# Patient Record
Sex: MALE | Race: WHITE | Employment: FULL TIME | URBAN - METROPOLITAN AREA
[De-identification: names, ages, dates, MRNs, and addresses within clinical notes are randomized per-mention and may not be internally consistent; named-entity substitution may affect disease eponyms.]

---

## 2017-02-09 ENCOUNTER — OFFICE VISIT (OUTPATIENT)
Dept: FAMILY MEDICINE CLINIC | Facility: CLINIC | Age: 47
End: 2017-02-09

## 2017-02-09 VITALS
OXYGEN SATURATION: 98 % | WEIGHT: 271 LBS | SYSTOLIC BLOOD PRESSURE: 110 MMHG | HEIGHT: 72 IN | BODY MASS INDEX: 36.7 KG/M2 | TEMPERATURE: 98 F | DIASTOLIC BLOOD PRESSURE: 78 MMHG | HEART RATE: 96 BPM

## 2017-02-09 DIAGNOSIS — Z00.00 ROUTINE PHYSICAL EXAMINATION: Primary | ICD-10-CM

## 2017-02-09 DIAGNOSIS — I10 ESSENTIAL HYPERTENSION: ICD-10-CM

## 2017-02-09 DIAGNOSIS — E78.00 HYPERCHOLESTEROLEMIA: ICD-10-CM

## 2017-02-09 DIAGNOSIS — Z23 IMMUNIZATION DUE: ICD-10-CM

## 2017-02-09 PROBLEM — G47.33 OSA ON CPAP: Status: ACTIVE | Noted: 2017-02-09

## 2017-02-09 PROBLEM — Z99.89 OSA ON CPAP: Status: ACTIVE | Noted: 2017-02-09

## 2017-02-09 PROCEDURE — 90715 TDAP VACCINE 7 YRS/> IM: CPT | Performed by: FAMILY MEDICINE

## 2017-02-09 PROCEDURE — 90471 IMMUNIZATION ADMIN: CPT | Performed by: FAMILY MEDICINE

## 2017-02-09 PROCEDURE — 99396 PREV VISIT EST AGE 40-64: CPT | Performed by: FAMILY MEDICINE

## 2017-02-09 RX ORDER — OMEGA-3-ACID ETHYL ESTERS 1 G/1
1 CAPSULE, LIQUID FILLED ORAL DAILY
COMMUNITY
End: 2021-08-16

## 2017-02-09 RX ORDER — VALSARTAN AND HYDROCHLOROTHIAZIDE 80; 12.5 MG/1; MG/1
TABLET, FILM COATED ORAL
Refills: 0 | COMMUNITY
Start: 2016-12-30 | End: 2017-04-04

## 2017-02-09 RX ORDER — VALSARTAN 80 MG/1
80 TABLET ORAL DAILY
COMMUNITY
End: 2017-02-09

## 2017-02-09 RX ORDER — SILDENAFIL 100 MG/1
100 TABLET, FILM COATED ORAL
Qty: 8 TABLET | Refills: 11 | Status: SHIPPED | OUTPATIENT
Start: 2017-02-09 | End: 2017-06-23

## 2017-02-09 NOTE — H&P
Ami Karyn is a 55year old male who presents for a complete physical exam.   HPI:   Patient presents for general physical exam   he has chronic lower back pain with occasional exacerbations.   Last significant exacerbation which affected his functionali Lumbar radicular syndrome     Lumbar disc herniation with radiculopathy     Back pain     Other and unspecified hyperlipidemia     Hypercholesterolemia     Spinal stenosis     Hives     SURENDRA on CPAP    Past Medical History   Diagnosis Date   • Other and un HSM or tenderness  : two descended testes,no masses,no hernia,no penile lesions  RECTAL: good rectal tone, prostate shows no masses  EXTREMITIES: no cyanosis, clubbing or edema  NEURO: Oriented times three,cranial nerves are intact,motor and sensory are

## 2017-02-14 ENCOUNTER — MED REC SCAN ONLY (OUTPATIENT)
Dept: FAMILY MEDICINE CLINIC | Facility: CLINIC | Age: 47
End: 2017-02-14

## 2017-02-14 ENCOUNTER — TELEPHONE (OUTPATIENT)
Dept: FAMILY MEDICINE CLINIC | Facility: CLINIC | Age: 47
End: 2017-02-14

## 2017-02-14 NOTE — TELEPHONE ENCOUNTER
Spoke with pt per dr Jaxson Garcia vitamin D low. Recommend start taking vitamin D 2000 IU OTC daily. Blood sugar slightly elevated, recommend to exercised and weight loss. Patient verbally  Understood.

## 2017-04-04 RX ORDER — ATORVASTATIN CALCIUM 10 MG/1
10 TABLET, FILM COATED ORAL NIGHTLY
Qty: 90 TABLET | Refills: 3 | Status: SHIPPED | OUTPATIENT
Start: 2017-04-04 | End: 2018-05-15

## 2017-04-04 RX ORDER — VALSARTAN AND HYDROCHLOROTHIAZIDE 80; 12.5 MG/1; MG/1
TABLET, FILM COATED ORAL
Qty: 90 TABLET | Refills: 3 | Status: SHIPPED | OUTPATIENT
Start: 2017-04-04 | End: 2018-08-20

## 2017-06-23 ENCOUNTER — OFFICE VISIT (OUTPATIENT)
Dept: FAMILY MEDICINE CLINIC | Facility: CLINIC | Age: 47
End: 2017-06-23

## 2017-06-23 VITALS
DIASTOLIC BLOOD PRESSURE: 70 MMHG | TEMPERATURE: 99 F | HEIGHT: 72 IN | BODY MASS INDEX: 35.25 KG/M2 | HEART RATE: 109 BPM | WEIGHT: 260.25 LBS | OXYGEN SATURATION: 96 % | RESPIRATION RATE: 16 BRPM | SYSTOLIC BLOOD PRESSURE: 130 MMHG

## 2017-06-23 DIAGNOSIS — N52.9 VASCULOGENIC ERECTILE DYSFUNCTION, UNSPECIFIED VASCULOGENIC ERECTILE DYSFUNCTION TYPE: Primary | ICD-10-CM

## 2017-06-23 PROCEDURE — 99214 OFFICE O/P EST MOD 30 MIN: CPT | Performed by: FAMILY MEDICINE

## 2017-06-23 RX ORDER — TADALAFIL 20 MG/1
20 TABLET ORAL AS NEEDED
Qty: 24 TABLET | Refills: 3 | Status: SHIPPED | OUTPATIENT
Start: 2017-06-23 | End: 2018-08-20

## 2017-06-23 NOTE — PROGRESS NOTES
CC: follow up    HPI:     ED:   Quality difficulty achieving and maintaining  Severity moderate  Duration chronic  Timing routine  Context viagra too expensive    ROS: no fever or chills, no cp or sob, no dysuria, no dizziness    Past Medical History   [place]

## 2017-09-17 ENCOUNTER — HOSPITAL ENCOUNTER (OUTPATIENT)
Age: 47
Discharge: HOME | End: 2017-09-17
Attending: FAMILY MEDICINE
Payer: COMMERCIAL

## 2017-09-17 VITALS
BODY MASS INDEX: 36.57 KG/M2 | TEMPERATURE: 98 F | SYSTOLIC BLOOD PRESSURE: 133 MMHG | WEIGHT: 270 LBS | HEIGHT: 72 IN | OXYGEN SATURATION: 97 % | HEART RATE: 81 BPM | DIASTOLIC BLOOD PRESSURE: 78 MMHG | RESPIRATION RATE: 16 BRPM

## 2017-09-17 DIAGNOSIS — M62.830 LUMBAR PARASPINAL MUSCLE SPASM: Primary | ICD-10-CM

## 2017-09-17 DIAGNOSIS — M54.50 BACK PAIN OF THORACOLUMBAR REGION: ICD-10-CM

## 2017-09-17 DIAGNOSIS — M54.6 BACK PAIN OF THORACOLUMBAR REGION: ICD-10-CM

## 2017-09-17 PROCEDURE — 99214 OFFICE O/P EST MOD 30 MIN: CPT

## 2017-09-17 PROCEDURE — 99213 OFFICE O/P EST LOW 20 MIN: CPT

## 2017-09-17 RX ORDER — METHYLPREDNISOLONE 4 MG/1
TABLET ORAL
Qty: 1 PACKAGE | Refills: 0 | Status: SHIPPED | OUTPATIENT
Start: 2017-09-17 | End: 2017-09-24

## 2017-09-17 NOTE — ED PROVIDER NOTES
Patient Seen in: [address] [facility]    History   Patient presents with:  Back Pain (musculoskeletal)    Stated Complaint: back pain    HPI  71-year-old male coming in with low mid left-sided back pain that he has had since Monday, over the l as otherwise stated in HPI.     Physical Exam   ED Triage Vitals [09/17/17 1127]  BP: 133/78  Pulse: 81  Resp: 16  Temp: 97.7 °F (36.5 °C)  Temp src: Temporal  SpO2: 97 %  O2 Device: None (Room air)    Current:/78   Pulse 81   Temp 97.7 °F (36.5 °C) ( drowsiness and dizziness and should not be used during the day time if out and about   STOP ALL NSAIDs - no ibuprofen / aleve / advil while on the Medrol dose pack   Alternate sit stand and walk   Exercises - back exercises at least 3 times per day recomme

## 2017-09-21 ENCOUNTER — OFFICE VISIT (OUTPATIENT)
Dept: FAMILY MEDICINE CLINIC | Facility: CLINIC | Age: 47
End: 2017-09-21

## 2017-09-21 VITALS
TEMPERATURE: 99 F | DIASTOLIC BLOOD PRESSURE: 82 MMHG | WEIGHT: 263 LBS | HEIGHT: 72 IN | SYSTOLIC BLOOD PRESSURE: 130 MMHG | OXYGEN SATURATION: 98 % | HEART RATE: 89 BPM | RESPIRATION RATE: 14 BRPM | BODY MASS INDEX: 35.62 KG/M2

## 2017-09-21 DIAGNOSIS — M54.5 RIGHT LOW BACK PAIN, UNSPECIFIED CHRONICITY, WITH SCIATICA PRESENCE UNSPECIFIED: Primary | ICD-10-CM

## 2017-09-21 DIAGNOSIS — M51.36 DDD (DEGENERATIVE DISC DISEASE), LUMBAR: ICD-10-CM

## 2017-09-21 DIAGNOSIS — M48.061 LUMBAR STENOSIS: ICD-10-CM

## 2017-09-21 PROCEDURE — 99214 OFFICE O/P EST MOD 30 MIN: CPT | Performed by: FAMILY MEDICINE

## 2017-09-21 RX ORDER — HYDROCODONE BITARTRATE AND ACETAMINOPHEN 10; 325 MG/1; MG/1
1 TABLET ORAL NIGHTLY PRN
Qty: 15 TABLET | Refills: 0 | Status: SHIPPED | OUTPATIENT
Start: 2017-09-21 | End: 2018-02-26

## 2017-09-21 NOTE — PROGRESS NOTES
CC: back pain    HPI:     Location lower back  Quality aching, sharp, tight  Severity moderrate  Duration about 9 days  Modifying factors worse when sleeping  Associated symptoms no bowel or bladder symptoms    ROS:   GENERAL HEALTH: feels well otherwise

## 2017-09-28 ENCOUNTER — MED REC SCAN ONLY (OUTPATIENT)
Dept: FAMILY MEDICINE CLINIC | Facility: CLINIC | Age: 47
End: 2017-09-28

## 2017-10-23 ENCOUNTER — MED REC SCAN ONLY (OUTPATIENT)
Dept: FAMILY MEDICINE CLINIC | Facility: CLINIC | Age: 47
End: 2017-10-23

## 2018-02-26 ENCOUNTER — OFFICE VISIT (OUTPATIENT)
Dept: FAMILY MEDICINE CLINIC | Facility: CLINIC | Age: 48
End: 2018-02-26

## 2018-02-26 ENCOUNTER — TELEPHONE (OUTPATIENT)
Dept: FAMILY MEDICINE CLINIC | Facility: CLINIC | Age: 48
End: 2018-02-26

## 2018-02-26 VITALS
TEMPERATURE: 99 F | BODY MASS INDEX: 36.98 KG/M2 | HEIGHT: 72 IN | SYSTOLIC BLOOD PRESSURE: 124 MMHG | WEIGHT: 273 LBS | RESPIRATION RATE: 18 BRPM | HEART RATE: 98 BPM | DIASTOLIC BLOOD PRESSURE: 80 MMHG | OXYGEN SATURATION: 97 %

## 2018-02-26 DIAGNOSIS — J01.10 ACUTE NON-RECURRENT FRONTAL SINUSITIS: Primary | ICD-10-CM

## 2018-02-26 DIAGNOSIS — Z00.00 GENERAL MEDICAL EXAM: ICD-10-CM

## 2018-02-26 PROCEDURE — 99214 OFFICE O/P EST MOD 30 MIN: CPT | Performed by: FAMILY MEDICINE

## 2018-02-26 RX ORDER — AZITHROMYCIN 250 MG/1
TABLET, FILM COATED ORAL
Qty: 6 TABLET | Refills: 0 | Status: SHIPPED | OUTPATIENT
Start: 2018-02-26 | End: 2018-08-20 | Stop reason: ALTCHOICE

## 2018-02-26 NOTE — PROGRESS NOTES
CC: congestion    HPI:     Location nasal, sinus  Quality pressure, drainage  Severity moderate  Duration several days  Timing constant  Associated symptoms some sore throat    ROS: no vomiting or diarrhea, pos ear pain    Past Medical History:   Diagnosis

## 2018-05-15 ENCOUNTER — TELEPHONE (OUTPATIENT)
Dept: FAMILY MEDICINE CLINIC | Facility: CLINIC | Age: 48
End: 2018-05-15

## 2018-05-15 RX ORDER — ATORVASTATIN CALCIUM 10 MG/1
10 TABLET, FILM COATED ORAL NIGHTLY
Qty: 30 TABLET | Refills: 0 | Status: SHIPPED | OUTPATIENT
Start: 2018-05-15 | End: 2018-06-14

## 2018-05-15 NOTE — TELEPHONE ENCOUNTER
Patient is out of his lipitor  Patient states he is going to get his [facility] work [facility] and then schedule his physical appointment. Patient wanted to know if a 30 day bridging supply can be sent to pharmacy to last until he comes in. Rx pended.   Ple

## 2018-06-15 RX ORDER — ATORVASTATIN CALCIUM 10 MG/1
TABLET, FILM COATED ORAL
Qty: 30 TABLET | Refills: 0 | Status: SHIPPED | OUTPATIENT
Start: 2018-06-15 | End: 2018-08-20 | Stop reason: DRUGHIGH

## 2018-06-15 NOTE — TELEPHONE ENCOUNTER
LOV: 2/26/18 frontal sinusitis    atorvastatin 10 MG Oral Tab 30 tablet 0 5/15/2018    Sig :  Take 1 tablet (10 mg total) by mouth nightly. Route:   Oral       No future appointments. Please advise.

## 2018-06-22 NOTE — TELEPHONE ENCOUNTER
Patient advised and verbalized understanding. Patient states he's going to get his labs done at [facility] and then follow up.

## 2018-07-23 ENCOUNTER — TELEPHONE (OUTPATIENT)
Dept: FAMILY MEDICINE CLINIC | Facility: CLINIC | Age: 48
End: 2018-07-23

## 2018-07-23 NOTE — TELEPHONE ENCOUNTER
Left message for patient to call. Need to advise of Valsartan recall. Patient needs to contact pharmacy to determine if his medication is from an affected pharmacy and call this office back to notify.

## 2018-07-27 NOTE — TELEPHONE ENCOUNTER
Spoke to patient. He will contact pharmacy to determine if his medication was affected by the recall and call this office back to let us know.

## 2018-08-07 NOTE — TELEPHONE ENCOUNTER
Phone call to patient. States he contacted his pharmacy and his medication is not affected by the recall.

## 2018-08-16 LAB
ABSOLUTE BASOPHILS: 38 CELLS/UL (ref 0–200)
ABSOLUTE EOSINOPHILS: 162 CELLS/UL (ref 15–500)
ABSOLUTE LYMPHOCYTES: 1231 CELLS/UL (ref 850–3900)
ABSOLUTE MONOCYTES: 599 CELLS/UL (ref 200–950)
ABSOLUTE NEUTROPHILS: 3370 CELLS/UL (ref 1500–7800)
ALBUMIN/GLOBULIN RATIO: 2 (CALC) (ref 1–2.5)
ALBUMIN: 4.5 G/DL (ref 3.6–5.1)
ALKALINE PHOSPHATASE: 65 U/L (ref 40–115)
ALT: 40 U/L (ref 9–46)
AST: 25 U/L (ref 10–40)
BASOPHILS: 0.7 %
BILIRUBIN, TOTAL: 0.9 MG/DL (ref 0.2–1.2)
BUN: 18 MG/DL (ref 7–25)
CALCIUM: 9.7 MG/DL (ref 8.6–10.3)
CARBON DIOXIDE: 28 MMOL/L (ref 20–32)
CHLORIDE: 99 MMOL/L (ref 98–110)
CHOL/HDLC RATIO: 4 (CALC)
CHOLESTEROL, TOTAL: 184 MG/DL
CREATININE: 1.11 MG/DL (ref 0.6–1.35)
EGFR IF AFRICN AM: 91 ML/MIN/1.73M2
EGFR IF NONAFRICN AM: 78 ML/MIN/1.73M2
EOSINOPHILS: 3 %
GLOBULIN: 2.3 G/DL (CALC) (ref 1.9–3.7)
GLUCOSE: 110 MG/DL (ref 65–99)
HDL CHOLESTEROL: 46 MG/DL
HEMATOCRIT: 43.2 % (ref 38.5–50)
HEMOGLOBIN A1C: 6.1 % OF TOTAL HGB
HEMOGLOBIN: 14.5 G/DL (ref 13.2–17.1)
LDL-CHOLESTEROL: 113 MG/DL (CALC)
LYMPHOCYTES: 22.8 %
MCH: 29.4 PG (ref 27–33)
MCHC: 33.6 G/DL (ref 32–36)
MCV: 87.6 FL (ref 80–100)
MONOCYTES: 11.1 %
MPV: 9.8 FL (ref 7.5–12.5)
NEUTROPHILS: 62.4 %
NON-HDL CHOLESTEROL: 138 MG/DL (CALC)
PLATELET COUNT: 243 THOUSAND/UL (ref 140–400)
POTASSIUM: 4.1 MMOL/L (ref 3.5–5.3)
PROTEIN, TOTAL: 6.8 G/DL (ref 6.1–8.1)
RDW: 13 % (ref 11–15)
RED BLOOD CELL COUNT: 4.93 MILLION/UL (ref 4.2–5.8)
SODIUM: 137 MMOL/L (ref 135–146)
TRIGLYCERIDES: 130 MG/DL
TSH W/REFLEX TO FT4: 1.37 MIU/L (ref 0.4–4.5)
VITAMIN D, 25-OH, TOTAL: 31 NG/ML (ref 30–100)
WHITE BLOOD CELL COUNT: 5.4 THOUSAND/UL (ref 3.8–10.8)

## 2018-08-20 ENCOUNTER — OFFICE VISIT (OUTPATIENT)
Dept: FAMILY MEDICINE CLINIC | Facility: CLINIC | Age: 48
End: 2018-08-20
Payer: COMMERCIAL

## 2018-08-20 VITALS
TEMPERATURE: 98 F | BODY MASS INDEX: 36.57 KG/M2 | RESPIRATION RATE: 18 BRPM | WEIGHT: 270 LBS | DIASTOLIC BLOOD PRESSURE: 86 MMHG | OXYGEN SATURATION: 98 % | HEIGHT: 72 IN | SYSTOLIC BLOOD PRESSURE: 124 MMHG | HEART RATE: 87 BPM

## 2018-08-20 DIAGNOSIS — R93.89 ABNORMAL MRI: ICD-10-CM

## 2018-08-20 DIAGNOSIS — Z00.00 GENERAL MEDICAL EXAM: Primary | ICD-10-CM

## 2018-08-20 DIAGNOSIS — R20.2 PARESTHESIA OF RIGHT LEG: ICD-10-CM

## 2018-08-20 PROCEDURE — 99396 PREV VISIT EST AGE 40-64: CPT | Performed by: FAMILY MEDICINE

## 2018-08-20 RX ORDER — TADALAFIL 20 MG/1
20 TABLET ORAL AS NEEDED
Qty: 24 TABLET | Refills: 3 | Status: SHIPPED | OUTPATIENT
Start: 2018-08-20 | End: 2019-08-30

## 2018-08-20 RX ORDER — ATORVASTATIN CALCIUM 20 MG/1
20 TABLET, FILM COATED ORAL NIGHTLY
Qty: 90 TABLET | Refills: 3 | Status: SHIPPED | OUTPATIENT
Start: 2018-08-20 | End: 2019-09-03

## 2018-08-20 RX ORDER — VALSARTAN AND HYDROCHLOROTHIAZIDE 80; 12.5 MG/1; MG/1
TABLET, FILM COATED ORAL
Qty: 90 TABLET | Refills: 3 | Status: SHIPPED | OUTPATIENT
Start: 2018-08-20 | End: 2019-09-03

## 2018-08-20 NOTE — PATIENT INSTRUCTIONS
REGARDING NEW CHOLESTEROL MEDICATION: MONITOR YOURSELF FOR CHANGES IN STOOLS, MUSCLE PAIN, ABDOMINAL PAIN, CHANGES IN SKIN COLOR, DARK COLORED URINE, WEAKNESS, OR ANY OTHER NEW ILL FEELING OR SYMPTOMS.        Dietary recommendations: restrict intake of carb

## 2018-08-20 NOTE — PROGRESS NOTES
Christoph Landon is a 50year old male who presents for a complete physical exam.   HPI:   Pt doing generally well.        Immunization History  Administered            Date(s) Administered    Influenza             11/01/2008      TDAP                  11/01/2 • Hives    • Hypercholesterolemia    • Other and unspecified hyperlipidemia    • Spinal stenosis       No past surgical history on file.    Family History   Problem Relation Age of Onset   • Hypertension Father    • Hyerplipidemia [Other] [OTHER] Father bruits  LUNGS: clear to auscultation  CARDIO: RRR without murmur  GI: good BS's,no masses, HSM or tenderness  MUSCULOSKELETAL: back is not tender,FROM of the back  EXTREMITIES: no cyanosis, clubbing or edema  NEURO: Oriented times three,cranial nerves are

## 2018-10-14 ENCOUNTER — HOSPITAL ENCOUNTER (OUTPATIENT)
Age: 48
Discharge: HOME | End: 2018-10-14
Payer: COMMERCIAL

## 2018-10-14 VITALS
BODY MASS INDEX: 33 KG/M2 | RESPIRATION RATE: 16 BRPM | SYSTOLIC BLOOD PRESSURE: 139 MMHG | WEIGHT: 245 LBS | TEMPERATURE: 99 F | HEART RATE: 83 BPM | OXYGEN SATURATION: 97 % | DIASTOLIC BLOOD PRESSURE: 73 MMHG

## 2018-10-14 DIAGNOSIS — B86 SCABIES: Primary | ICD-10-CM

## 2018-10-14 PROCEDURE — 99214 OFFICE O/P EST MOD 30 MIN: CPT

## 2018-10-14 PROCEDURE — 99213 OFFICE O/P EST LOW 20 MIN: CPT

## 2018-10-14 RX ORDER — PERMETHRIN 50 MG/G
1 CREAM TOPICAL ONCE
Qty: 60 G | Refills: 1 | Status: SHIPPED | OUTPATIENT
Start: 2018-10-14 | End: 2020-10-02

## 2018-10-14 RX ORDER — PREDNISONE 10 MG/1
TABLET ORAL
Qty: 20 TABLET | Refills: 0 | Status: SHIPPED | OUTPATIENT
Start: 2018-10-14 | End: 2019-04-02

## 2018-10-15 ENCOUNTER — TELEPHONE (OUTPATIENT)
Dept: FAMILY MEDICINE CLINIC | Facility: CLINIC | Age: 48
End: 2018-10-15

## 2018-10-15 NOTE — TELEPHONE ENCOUNTER
Patient states no other family members currently have the rash. Patient states they are washing all linens in the house. Patient will have family members contact PCP if they exhibit any signs of rash.

## 2018-10-15 NOTE — TELEPHONE ENCOUNTER
Pt called stated he was dx with scabies in the IC and he was wondering if his family members need to be put on medication.

## 2018-10-19 ENCOUNTER — TELEPHONE (OUTPATIENT)
Dept: FAMILY MEDICINE CLINIC | Facility: CLINIC | Age: 48
End: 2018-10-19

## 2018-10-19 NOTE — TELEPHONE ENCOUNTER
Patient had questions regarding scabies - explained to patient it is spread by contact  Patient has already been treated by the IC and has a follow up appointment scheduled for Monday.   Future Appointments   Date Time Provider Ghazal Pettit   10/22/201

## 2018-10-19 NOTE — TELEPHONE ENCOUNTER
Pt has appt on Monday but has questions he would like answered before appt regarding his diagnosis. Is it catchy? How did he get this?

## 2018-10-22 ENCOUNTER — OFFICE VISIT (OUTPATIENT)
Dept: FAMILY MEDICINE CLINIC | Facility: CLINIC | Age: 48
End: 2018-10-22
Payer: COMMERCIAL

## 2018-10-22 VITALS
BODY MASS INDEX: 33.18 KG/M2 | DIASTOLIC BLOOD PRESSURE: 80 MMHG | OXYGEN SATURATION: 98 % | TEMPERATURE: 99 F | HEART RATE: 80 BPM | RESPIRATION RATE: 18 BRPM | SYSTOLIC BLOOD PRESSURE: 130 MMHG | HEIGHT: 72 IN | WEIGHT: 245 LBS

## 2018-10-22 DIAGNOSIS — L30.9 DERMATITIS: Primary | ICD-10-CM

## 2018-10-22 PROCEDURE — 99213 OFFICE O/P EST LOW 20 MIN: CPT | Performed by: FAMILY MEDICINE

## 2018-10-22 NOTE — PROGRESS NOTES
CC: rash    HPI:     Location multi focal - left arm / ac, rt groin, right anterior leg  Quality red, raised, papular, confluent, patch  Severity moderate  Duration about a week  Timing constant  Modifying factors permethrin has not resolved    ROS:   No f

## 2019-02-25 ENCOUNTER — TELEPHONE (OUTPATIENT)
Dept: FAMILY MEDICINE CLINIC | Facility: CLINIC | Age: 49
End: 2019-02-25

## 2019-02-25 DIAGNOSIS — Z00.00 ROUTINE MEDICAL EXAM: Primary | ICD-10-CM

## 2019-02-25 DIAGNOSIS — E78.00 HYPERCHOLESTEROLEMIA: ICD-10-CM

## 2019-02-25 NOTE — TELEPHONE ENCOUNTER
Patient wants 6 month f/u order for labs faxed to [address] [facility] in [place]. Will make follow up appointment to see [facility] after labs are done. Please call patient when order is sent to [facility], so he can get labs done.

## 2019-02-25 NOTE — TELEPHONE ENCOUNTER
Pt notified and verbalized understanding. Lab order faxed to the [address] [place] [address] in [place] at [phone].

## 2019-03-02 LAB
ALBUMIN/GLOBULIN RATIO: 2 (CALC) (ref 1–2.5)
ALBUMIN: 4.7 G/DL (ref 3.6–5.1)
ALKALINE PHOSPHATASE: 65 U/L (ref 40–115)
ALT: 18 U/L (ref 9–46)
AST: 13 U/L (ref 10–40)
BILIRUBIN, TOTAL: 1.2 MG/DL (ref 0.2–1.2)
BUN: 22 MG/DL (ref 7–25)
CALCIUM: 10 MG/DL (ref 8.6–10.3)
CARBON DIOXIDE: 33 MMOL/L (ref 20–32)
CHLORIDE: 96 MMOL/L (ref 98–110)
CHOL/HDLC RATIO: 3.5 (CALC)
CHOLESTEROL, TOTAL: 166 MG/DL
CREATINE KINASE, TOTAL: 75 U/L (ref 44–196)
CREATININE: 1.02 MG/DL (ref 0.6–1.35)
EGFR IF AFRICN AM: 100 ML/MIN/1.73M2
EGFR IF NONAFRICN AM: 87 ML/MIN/1.73M2
GLOBULIN: 2.3 G/DL (CALC) (ref 1.9–3.7)
GLUCOSE: 105 MG/DL (ref 65–99)
HDL CHOLESTEROL: 48 MG/DL
HEMOGLOBIN A1C: 5.6 % OF TOTAL HGB
LDL-CHOLESTEROL: 100 MG/DL (CALC)
NON-HDL CHOLESTEROL: 118 MG/DL (CALC)
POTASSIUM: 4.2 MMOL/L (ref 3.5–5.3)
PROTEIN, TOTAL: 7 G/DL (ref 6.1–8.1)
SODIUM: 135 MMOL/L (ref 135–146)
TRIGLYCERIDES: 90 MG/DL

## 2019-04-02 ENCOUNTER — OFFICE VISIT (OUTPATIENT)
Dept: FAMILY MEDICINE CLINIC | Facility: CLINIC | Age: 49
End: 2019-04-02
Payer: COMMERCIAL

## 2019-04-02 VITALS
RESPIRATION RATE: 18 BRPM | TEMPERATURE: 97 F | WEIGHT: 240 LBS | SYSTOLIC BLOOD PRESSURE: 126 MMHG | BODY MASS INDEX: 32.51 KG/M2 | OXYGEN SATURATION: 98 % | HEIGHT: 72 IN | DIASTOLIC BLOOD PRESSURE: 82 MMHG | HEART RATE: 72 BPM

## 2019-04-02 DIAGNOSIS — J02.9 SORE THROAT: Primary | ICD-10-CM

## 2019-04-02 PROCEDURE — 87880 STREP A ASSAY W/OPTIC: CPT | Performed by: FAMILY MEDICINE

## 2019-04-02 PROCEDURE — 87081 CULTURE SCREEN ONLY: CPT | Performed by: FAMILY MEDICINE

## 2019-04-02 PROCEDURE — 99213 OFFICE O/P EST LOW 20 MIN: CPT | Performed by: FAMILY MEDICINE

## 2019-04-02 NOTE — PROGRESS NOTES
CC: sore throat    HPI:     Sore throat and ear pain a couple days.      ROS: on fever or chills    EXAM:   /82   Pulse 72   Temp 97.4 °F (36.3 °C) (Temporal)   Resp 18   Ht 72\"   Wt 240 lb   SpO2 98%   BMI 32.55 kg/m²     H: RRR  L: CTA magen  ENT: tm

## 2019-04-04 RX ORDER — AMOXICILLIN 500 MG/1
500 CAPSULE ORAL 3 TIMES DAILY
Qty: 30 CAPSULE | Refills: 0 | Status: SHIPPED | OUTPATIENT
Start: 2019-04-04 | End: 2019-04-14

## 2019-08-30 NOTE — TELEPHONE ENCOUNTER
LOV: 4/2/19 for sore throat    Tadalafil (CIALIS) 20 MG Oral Tab 24 tablet 3 8/20/2018    Sig:   Take 1 tablet (20 mg total) by mouth as needed for Erectile Dysfunction. Route:   Oral       No future appointments.   Please advise

## 2019-09-03 RX ORDER — TADALAFIL 20 MG/1
TABLET ORAL
Qty: 24 TABLET | Refills: 0 | Status: SHIPPED | OUTPATIENT
Start: 2019-09-03 | End: 2020-10-02

## 2019-09-04 NOTE — TELEPHONE ENCOUNTER
Both meds Last refilled on 8/20/18 for # 90 with 3 refills  Last lipid, BUN 22, creatinine 1.02 on 3/1/19  Last OV 4/2/19, /82  No future appointments. Thank you.

## 2019-09-06 RX ORDER — ATORVASTATIN CALCIUM 20 MG/1
TABLET, FILM COATED ORAL
Qty: 90 TABLET | Refills: 0 | Status: SHIPPED | OUTPATIENT
Start: 2019-09-06 | End: 2020-03-20

## 2019-09-06 RX ORDER — VALSARTAN AND HYDROCHLOROTHIAZIDE 80; 12.5 MG/1; MG/1
TABLET, FILM COATED ORAL
Qty: 90 TABLET | Refills: 0 | Status: SHIPPED | OUTPATIENT
Start: 2019-09-06 | End: 2020-03-20

## 2020-03-20 RX ORDER — VALSARTAN AND HYDROCHLOROTHIAZIDE 80; 12.5 MG/1; MG/1
TABLET, FILM COATED ORAL
Qty: 90 TABLET | Refills: 0 | Status: SHIPPED | OUTPATIENT
Start: 2020-03-20 | End: 2020-10-02

## 2020-03-20 RX ORDER — ATORVASTATIN CALCIUM 20 MG/1
TABLET, FILM COATED ORAL
Qty: 90 TABLET | Refills: 0 | Status: SHIPPED | OUTPATIENT
Start: 2020-03-20 | End: 2020-09-10

## 2020-03-20 NOTE — TELEPHONE ENCOUNTER
Valsartan-hydroCHLOROthiazide 80-12.5 MG Oral Tab 90 tablet 0 9/6/2019    Sig:   TAKE 1 TABLET BY MOUTH EVERY DAY         Hypertension Medications Protocol Failed3/20 10:21 AM   CMP or BMP in past 12 months    Appointment in past 6 or next 3 months    Last

## 2020-09-03 NOTE — TELEPHONE ENCOUNTER
Last refilled on 3/20/20 for # 90 with 0 refills  Last lipid 3/1/19  Last OV 4/2/19  No future appointments.      Due for px and labs    [place]

## 2020-09-10 ENCOUNTER — TELEPHONE (OUTPATIENT)
Dept: FAMILY MEDICINE CLINIC | Facility: CLINIC | Age: 50
End: 2020-09-10

## 2020-09-10 RX ORDER — ATORVASTATIN CALCIUM 20 MG/1
TABLET, FILM COATED ORAL
Qty: 30 TABLET | Refills: 0 | Status: SHIPPED | OUTPATIENT
Start: 2020-09-10 | End: 2020-10-02

## 2020-09-10 RX ORDER — ATORVASTATIN CALCIUM 20 MG/1
TABLET, FILM COATED ORAL
Qty: 90 TABLET | Refills: 0 | OUTPATIENT
Start: 2020-09-10

## 2020-09-10 NOTE — TELEPHONE ENCOUNTER
rx sent per bridging protocol  Patient notified and verbalized understanding.   Future Appointments   Date Time Provider Ghazal Pettit   10/2/2020  9:00 AM DO CATHERINE BoudreauxOSW [facility]

## 2020-10-02 ENCOUNTER — OFFICE VISIT (OUTPATIENT)
Dept: FAMILY MEDICINE CLINIC | Facility: CLINIC | Age: 50
End: 2020-10-02
Payer: COMMERCIAL

## 2020-10-02 VITALS
DIASTOLIC BLOOD PRESSURE: 84 MMHG | HEIGHT: 72 IN | HEART RATE: 82 BPM | OXYGEN SATURATION: 97 % | WEIGHT: 253 LBS | BODY MASS INDEX: 34.27 KG/M2 | RESPIRATION RATE: 18 BRPM | TEMPERATURE: 97 F | SYSTOLIC BLOOD PRESSURE: 126 MMHG

## 2020-10-02 DIAGNOSIS — Z23 NEED FOR VACCINATION: ICD-10-CM

## 2020-10-02 DIAGNOSIS — Z12.11 COLON CANCER SCREENING: ICD-10-CM

## 2020-10-02 DIAGNOSIS — Z00.00 GENERAL MEDICAL EXAM: Primary | ICD-10-CM

## 2020-10-02 PROCEDURE — 82550 ASSAY OF CK (CPK): CPT | Performed by: FAMILY MEDICINE

## 2020-10-02 PROCEDURE — 80050 GENERAL HEALTH PANEL: CPT | Performed by: FAMILY MEDICINE

## 2020-10-02 PROCEDURE — 99396 PREV VISIT EST AGE 40-64: CPT | Performed by: FAMILY MEDICINE

## 2020-10-02 PROCEDURE — 82306 VITAMIN D 25 HYDROXY: CPT | Performed by: FAMILY MEDICINE

## 2020-10-02 PROCEDURE — 90686 IIV4 VACC NO PRSV 0.5 ML IM: CPT | Performed by: FAMILY MEDICINE

## 2020-10-02 PROCEDURE — 3074F SYST BP LT 130 MM HG: CPT | Performed by: FAMILY MEDICINE

## 2020-10-02 PROCEDURE — 84153 ASSAY OF PSA TOTAL: CPT | Performed by: FAMILY MEDICINE

## 2020-10-02 PROCEDURE — 3008F BODY MASS INDEX DOCD: CPT | Performed by: FAMILY MEDICINE

## 2020-10-02 PROCEDURE — 83036 HEMOGLOBIN GLYCOSYLATED A1C: CPT | Performed by: FAMILY MEDICINE

## 2020-10-02 PROCEDURE — 80061 LIPID PANEL: CPT | Performed by: FAMILY MEDICINE

## 2020-10-02 PROCEDURE — 3079F DIAST BP 80-89 MM HG: CPT | Performed by: FAMILY MEDICINE

## 2020-10-02 PROCEDURE — 90471 IMMUNIZATION ADMIN: CPT | Performed by: FAMILY MEDICINE

## 2020-10-02 RX ORDER — VALSARTAN AND HYDROCHLOROTHIAZIDE 80; 12.5 MG/1; MG/1
1 TABLET, FILM COATED ORAL DAILY
Qty: 90 TABLET | Refills: 1 | Status: SHIPPED | OUTPATIENT
Start: 2020-10-02 | End: 2021-07-21

## 2020-10-02 RX ORDER — TADALAFIL 20 MG/1
20 TABLET ORAL AS NEEDED
Qty: 24 TABLET | Refills: 1 | Status: SHIPPED | OUTPATIENT
Start: 2020-10-02 | End: 2021-08-16

## 2020-10-02 RX ORDER — ATORVASTATIN CALCIUM 20 MG/1
20 TABLET, FILM COATED ORAL NIGHTLY
Qty: 90 TABLET | Refills: 1 | Status: SHIPPED | OUTPATIENT
Start: 2020-10-02 | End: 2021-07-19

## 2020-10-02 NOTE — PROGRESS NOTES
Henrik Lino is a 48year old male who presents for a complete physical exam.   HPI:   Pt generally doing well.        Immunization History  Administered            Date(s) Administered    Influenza             11/01/2008      TDAP                  11/01/2 tablet 0   • Omega-3-acid Ethyl Esters 1 g Oral Cap Take 1 g by mouth daily. • Fiber, Guar Gum, Oral Chew Tab Chew by mouth. • Multiple Vitamins-Minerals (EYE VITAMINS) Oral Cap Take  by mouth daily.      • Triamcinolone Acetonide (NASACORT ALLERGY history  ALL/ASTHMA: denies hx of allergy or asthma    EXAM:   /84   Pulse 82   Temp 97.3 °F (36.3 °C) (Other)   Resp 18   Ht 72\"   Wt 253 lb (114.8 kg)   SpO2 97%   BMI 34.31 kg/m²   GENERAL: well developed, well nourished,in no apparent distress

## 2021-07-19 ENCOUNTER — TELEPHONE (OUTPATIENT)
Dept: FAMILY MEDICINE CLINIC | Facility: CLINIC | Age: 51
End: 2021-07-19

## 2021-07-19 RX ORDER — ATORVASTATIN CALCIUM 20 MG/1
20 TABLET, FILM COATED ORAL NIGHTLY
Qty: 90 TABLET | Refills: 0 | Status: SHIPPED | OUTPATIENT
Start: 2021-07-19 | End: 2022-01-04

## 2021-07-19 RX ORDER — VALSARTAN AND HYDROCHLOROTHIAZIDE 80; 12.5 MG/1; MG/1
1 TABLET, FILM COATED ORAL DAILY
Qty: 90 TABLET | Refills: 1 | OUTPATIENT
Start: 2021-07-19

## 2021-07-19 NOTE — TELEPHONE ENCOUNTER
Pharmacy called and LM wanting to get rx refill for pt. States that multiple request were sent just wanted to make sure we got them.      Valsartan-hydroCHLOROthiazide 80-12.5 MG Oral Tab [[id number]] (Order [id number])  atorvastatin 20 MG Oral Tab [[id number]] Josette Llamas

## 2021-07-19 NOTE — TELEPHONE ENCOUNTER
Cholesterol Medication Protocol [id number]/12/2021 01:17 PM   ALT < 80 Protocol Details    ALT resulted within past year     Lipid panel within past 12 months           Left detailed message due for OV  Ok per HIPPA form.

## 2021-07-21 RX ORDER — VALSARTAN AND HYDROCHLOROTHIAZIDE 80; 12.5 MG/1; MG/1
1 TABLET, FILM COATED ORAL DAILY
Qty: 90 TABLET | Refills: 0 | Status: SHIPPED | OUTPATIENT
Start: 2021-07-21

## 2021-07-21 NOTE — TELEPHONE ENCOUNTER
Pt scheduled apt   Future Appointments   Date Time Provider Ghazal Pettit   8/16/2021  3:15 PM Corwin Ford MD [facility]     Pt would like a partial sent to Pharmacy as he is out

## 2021-08-16 ENCOUNTER — OFFICE VISIT (OUTPATIENT)
Dept: FAMILY MEDICINE CLINIC | Facility: CLINIC | Age: 51
End: 2021-08-16
Payer: COMMERCIAL

## 2021-08-16 VITALS
HEIGHT: 72 IN | SYSTOLIC BLOOD PRESSURE: 130 MMHG | DIASTOLIC BLOOD PRESSURE: 86 MMHG | RESPIRATION RATE: 18 BRPM | WEIGHT: 257 LBS | TEMPERATURE: 98 F | HEART RATE: 77 BPM | OXYGEN SATURATION: 98 % | BODY MASS INDEX: 34.81 KG/M2

## 2021-08-16 DIAGNOSIS — E55.9 VITAMIN D DEFICIENCY: Primary | ICD-10-CM

## 2021-08-16 DIAGNOSIS — Z51.81 THERAPEUTIC DRUG MONITORING: ICD-10-CM

## 2021-08-16 DIAGNOSIS — N52.9 ERECTILE DYSFUNCTION, UNSPECIFIED ERECTILE DYSFUNCTION TYPE: ICD-10-CM

## 2021-08-16 DIAGNOSIS — R73.03 PRE-DIABETES: ICD-10-CM

## 2021-08-16 DIAGNOSIS — I10 ESSENTIAL HYPERTENSION: ICD-10-CM

## 2021-08-16 DIAGNOSIS — Z12.11 COLON CANCER SCREENING: ICD-10-CM

## 2021-08-16 LAB — VIT D+METAB SERPL-MCNC: 20.6 NG/ML (ref 30–100)

## 2021-08-16 PROCEDURE — 3079F DIAST BP 80-89 MM HG: CPT | Performed by: FAMILY MEDICINE

## 2021-08-16 PROCEDURE — 3075F SYST BP GE 130 - 139MM HG: CPT | Performed by: FAMILY MEDICINE

## 2021-08-16 PROCEDURE — 3008F BODY MASS INDEX DOCD: CPT | Performed by: FAMILY MEDICINE

## 2021-08-16 PROCEDURE — 99214 OFFICE O/P EST MOD 30 MIN: CPT | Performed by: FAMILY MEDICINE

## 2021-08-16 PROCEDURE — 82306 VITAMIN D 25 HYDROXY: CPT | Performed by: FAMILY MEDICINE

## 2021-08-16 PROCEDURE — 83036 HEMOGLOBIN GLYCOSYLATED A1C: CPT | Performed by: FAMILY MEDICINE

## 2021-08-16 NOTE — PROGRESS NOTES
Patient presents with:  Hypertension: BP check, NP       HPI:    Patient ID: Coral Harman is a 46year old male here for med check.     Has HTN  Dx approx 5 years ago  Controlled on current dose at least 2 years  Home BP: Does not check regularly  No medic Problem Relation Age of Onset   • Hypertension Father    • Other (Hyerplipidemia [Other]) Father    • Stroke Father    • Other (Hyperlipidemia [Other]) Mother    • Thyroid disease Mother    • Hypertension Paternal Grandfather    • Other (Hyperlipidemia [ Encounter      Hemoglobin A1C [E]      Vitamin D [E]      Vitamin D, 25-Hydroxy      *Venipuncture      Meds This Visit:  Requested Prescriptions     Signed Prescriptions Disp Refills   • Tadalafil 20 MG Oral Tab 24 tablet 0     Sig: Take 1 tablet (20 mg t

## 2021-08-17 LAB
EST. AVERAGE GLUCOSE BLD GHB EST-MCNC: 140 MG/DL (ref 68–126)
HBA1C MFR BLD HPLC: 6.5 % (ref ?–5.7)

## 2021-08-17 RX ORDER — TADALAFIL 20 MG/1
20 TABLET ORAL AS NEEDED
Qty: 24 TABLET | Refills: 0 | Status: SHIPPED | OUTPATIENT
Start: 2021-08-17

## 2021-08-24 ENCOUNTER — TELEPHONE (OUTPATIENT)
Dept: FAMILY MEDICINE CLINIC | Facility: CLINIC | Age: 51
End: 2021-08-24

## 2021-08-24 NOTE — TELEPHONE ENCOUNTER
----- Message from Karen Hutchinson MD sent at 8/23/2021 10:53 PM CDT -----  Hemoglobin A1c has increased to 6.5, which puts him in the diabetic category.   If he feels like there is room for improvement in his diet, I would like him to follow a strict low-ca

## 2021-08-25 RX ORDER — ERGOCALCIFEROL 1.25 MG/1
50000 CAPSULE ORAL WEEKLY
Qty: 12 CAPSULE | Refills: 0 | Status: SHIPPED | OUTPATIENT
Start: 2021-08-25 | End: 2021-11-11

## 2021-08-25 NOTE — TELEPHONE ENCOUNTER
Pt notified and verbalized understanding. He wants to work on his diet and exercise for right now. Vitamin D Rx sent. Reminder placed for 6mos - pt will be due for his px at that time.

## 2021-12-27 NOTE — TELEPHONE ENCOUNTER
LOV 8/16/21 for HTN. Cholesterol Medication Protocol Failed 12/24/2021 08:56 AM   Protocol Details  ALT < 80    ALT resulted within past year    Lipid panel within past 12 months    Appointment within past 12 or next 3 months     No future appointments.

## 2021-12-29 RX ORDER — ATORVASTATIN CALCIUM 20 MG/1
TABLET, FILM COATED ORAL
Qty: 90 TABLET | Refills: 0 | OUTPATIENT
Start: 2021-12-29

## 2021-12-29 NOTE — TELEPHONE ENCOUNTER
Delivery Read From Message Type Attachments Subject   12/27/2021  9:15 AM Willian Smart RN Patient Medical Advice Request  Physical   No future appointments.

## 2022-01-04 ENCOUNTER — TELEPHONE (OUTPATIENT)
Dept: FAMILY MEDICINE CLINIC | Facility: CLINIC | Age: 52
End: 2022-01-04

## 2022-01-04 RX ORDER — ATORVASTATIN CALCIUM 20 MG/1
20 TABLET, FILM COATED ORAL NIGHTLY
Qty: 90 TABLET | Refills: 0 | Status: SHIPPED | OUTPATIENT
Start: 2022-01-04

## 2022-01-04 NOTE — TELEPHONE ENCOUNTER
Pt scheduled his physical and fasting labs:   Future Appointments   Date Time Provider Ghazal Hodan   1/19/2022  8:15 AM EMG [facility] NURSE [facility]   1/24/2022  2:30 PM Fanta Sanchez MD [facility]     Pt is asking if he can get a excep

## 2022-01-12 ENCOUNTER — TELEPHONE (OUTPATIENT)
Dept: FAMILY MEDICINE CLINIC | Facility: CLINIC | Age: 52
End: 2022-01-12

## 2022-01-12 DIAGNOSIS — Z00.00 ANNUAL PHYSICAL EXAM: Primary | ICD-10-CM

## 2022-01-12 NOTE — TELEPHONE ENCOUNTER
Patient coming for labs 1/19 and Px 1/24  Could you place orders?   Future Appointments   Date Time Provider Ghazal Pettit   1/19/2022  8:15 AM EMG [facility] NURSE [facility] [place]   1/24/2022  2:30 PM Yesica Thayer MD [facility]

## 2022-01-19 ENCOUNTER — NURSE ONLY (OUTPATIENT)
Dept: FAMILY MEDICINE CLINIC | Facility: CLINIC | Age: 52
End: 2022-01-19
Payer: COMMERCIAL

## 2022-01-19 DIAGNOSIS — Z00.00 ANNUAL PHYSICAL EXAM: ICD-10-CM

## 2022-01-19 LAB
ALBUMIN SERPL-MCNC: 4.3 G/DL (ref 3.4–5)
ALBUMIN/GLOB SERPL: 1.5 {RATIO} (ref 1–2)
ALP LIVER SERPL-CCNC: 77 U/L
ALT SERPL-CCNC: 41 U/L
ANION GAP SERPL CALC-SCNC: 6 MMOL/L (ref 0–18)
AST SERPL-CCNC: 22 U/L (ref 15–37)
BASOPHILS # BLD AUTO: 0.04 X10(3) UL (ref 0–0.2)
BASOPHILS NFR BLD AUTO: 0.6 %
BILIRUB SERPL-MCNC: 0.9 MG/DL (ref 0.1–2)
BUN BLD-MCNC: 23 MG/DL (ref 7–18)
CALCIUM BLD-MCNC: 9.7 MG/DL (ref 8.5–10.1)
CHLORIDE SERPL-SCNC: 100 MMOL/L (ref 98–112)
CHOLEST SERPL-MCNC: 197 MG/DL (ref ?–200)
CO2 SERPL-SCNC: 29 MMOL/L (ref 21–32)
COMPLEXED PSA SERPL-MCNC: 1.37 NG/ML (ref ?–4)
CREAT BLD-MCNC: 1.15 MG/DL
EOSINOPHIL # BLD AUTO: 0.19 X10(3) UL (ref 0–0.7)
EOSINOPHIL NFR BLD AUTO: 2.9 %
ERYTHROCYTE [DISTWIDTH] IN BLOOD BY AUTOMATED COUNT: 12.9 %
EST. AVERAGE GLUCOSE BLD GHB EST-MCNC: 137 MG/DL (ref 68–126)
FASTING PATIENT LIPID ANSWER: YES
FASTING STATUS PATIENT QL REPORTED: YES
GLOBULIN PLAS-MCNC: 2.9 G/DL (ref 2.8–4.4)
GLUCOSE BLD-MCNC: 134 MG/DL (ref 70–99)
HBA1C MFR BLD: 6.4 % (ref ?–5.7)
HCT VFR BLD AUTO: 47.8 %
HDLC SERPL-MCNC: 43 MG/DL (ref 40–59)
HGB BLD-MCNC: 15.3 G/DL
IMM GRANULOCYTES # BLD AUTO: 0.01 X10(3) UL (ref 0–1)
IMM GRANULOCYTES NFR BLD: 0.2 %
LDLC SERPL CALC-MCNC: 119 MG/DL (ref ?–100)
LYMPHOCYTES # BLD AUTO: 1.34 X10(3) UL (ref 1–4)
LYMPHOCYTES NFR BLD AUTO: 20.7 %
MCH RBC QN AUTO: 29.1 PG (ref 26–34)
MCHC RBC AUTO-ENTMCNC: 32 G/DL (ref 31–37)
MCV RBC AUTO: 91 FL
MONOCYTES # BLD AUTO: 0.6 X10(3) UL (ref 0.1–1)
MONOCYTES NFR BLD AUTO: 9.3 %
NEUTROPHILS # BLD AUTO: 4.3 X10 (3) UL (ref 1.5–7.7)
NEUTROPHILS # BLD AUTO: 4.3 X10(3) UL (ref 1.5–7.7)
NEUTROPHILS NFR BLD AUTO: 66.3 %
NONHDLC SERPL-MCNC: 154 MG/DL (ref ?–130)
OSMOLALITY SERPL CALC.SUM OF ELEC: 286 MOSM/KG (ref 275–295)
PLATELET # BLD AUTO: 254 10(3)UL (ref 150–450)
POTASSIUM SERPL-SCNC: 4.3 MMOL/L (ref 3.5–5.1)
PROT SERPL-MCNC: 7.2 G/DL (ref 6.4–8.2)
RBC # BLD AUTO: 5.25 X10(6)UL
SODIUM SERPL-SCNC: 135 MMOL/L (ref 136–145)
TRIGL SERPL-MCNC: 201 MG/DL (ref 30–149)
TSI SER-ACNC: 1.17 MIU/ML (ref 0.36–3.74)
VIT D+METAB SERPL-MCNC: 27 NG/ML (ref 30–100)
VLDLC SERPL CALC-MCNC: 35 MG/DL (ref 0–30)
WBC # BLD AUTO: 6.5 X10(3) UL (ref 4–11)

## 2022-01-19 PROCEDURE — 3044F HG A1C LEVEL LT 7.0%: CPT | Performed by: FAMILY MEDICINE

## 2022-01-19 PROCEDURE — 82306 VITAMIN D 25 HYDROXY: CPT | Performed by: FAMILY MEDICINE

## 2022-01-19 PROCEDURE — 80050 GENERAL HEALTH PANEL: CPT | Performed by: FAMILY MEDICINE

## 2022-01-19 PROCEDURE — 84153 ASSAY OF PSA TOTAL: CPT | Performed by: FAMILY MEDICINE

## 2022-01-19 PROCEDURE — 83036 HEMOGLOBIN GLYCOSYLATED A1C: CPT | Performed by: FAMILY MEDICINE

## 2022-01-19 PROCEDURE — 80061 LIPID PANEL: CPT | Performed by: FAMILY MEDICINE

## 2022-01-19 NOTE — PROGRESS NOTES
Karyle Livers presents today for nurse visit. Labs ordered by Dr Ramya Turcios.  2 gold, 1 lavender drawn from right ac area with 1 attempt with straight needle. Patient tolerated well. Left office in stable condition.

## 2022-01-29 NOTE — ED PROVIDER NOTES
Patient Seen in: [address] [facility]    History   Patient presents with:  Rash Skin Problem (integumentary)    Stated Complaint: Rash that is spreading    54-year-old male presents today with a rash to the webbing of the right hand, left AC a Physical Exam   Constitutional: He is oriented to person, place, and time. He appears well-developed and well-nourished. No distress. HENT:   Head: Normocephalic. Eyes: Pupils are equal, round, and reactive to light.    Neck: Normal range of mot Pt presents to ED c/o HCG test. 6 weeks pregnant, .

## 2022-02-09 ENCOUNTER — OFFICE VISIT (OUTPATIENT)
Dept: FAMILY MEDICINE CLINIC | Facility: CLINIC | Age: 52
End: 2022-02-09
Payer: COMMERCIAL

## 2022-02-09 VITALS
SYSTOLIC BLOOD PRESSURE: 134 MMHG | HEIGHT: 72 IN | DIASTOLIC BLOOD PRESSURE: 80 MMHG | TEMPERATURE: 97 F | WEIGHT: 264 LBS | RESPIRATION RATE: 18 BRPM | BODY MASS INDEX: 35.76 KG/M2 | HEART RATE: 89 BPM | OXYGEN SATURATION: 98 %

## 2022-02-09 DIAGNOSIS — E55.9 VITAMIN D DEFICIENCY: ICD-10-CM

## 2022-02-09 DIAGNOSIS — Z12.11 SCREENING FOR COLON CANCER: ICD-10-CM

## 2022-02-09 DIAGNOSIS — N52.9 ERECTILE DYSFUNCTION, UNSPECIFIED ERECTILE DYSFUNCTION TYPE: ICD-10-CM

## 2022-02-09 DIAGNOSIS — Z00.00 ANNUAL PHYSICAL EXAM: Primary | ICD-10-CM

## 2022-02-09 DIAGNOSIS — I10 ESSENTIAL HYPERTENSION: ICD-10-CM

## 2022-02-09 DIAGNOSIS — R73.03 PRE-DIABETES: ICD-10-CM

## 2022-02-09 PROBLEM — E11.9 TYPE 2 DIABETES MELLITUS (HCC): Status: ACTIVE | Noted: 2022-02-09

## 2022-02-09 PROBLEM — E66.01 MORBID (SEVERE) OBESITY DUE TO EXCESS CALORIES (HCC): Status: ACTIVE | Noted: 2022-02-09

## 2022-02-09 PROCEDURE — 3079F DIAST BP 80-89 MM HG: CPT | Performed by: FAMILY MEDICINE

## 2022-02-09 PROCEDURE — 99396 PREV VISIT EST AGE 40-64: CPT | Performed by: FAMILY MEDICINE

## 2022-02-09 PROCEDURE — 3075F SYST BP GE 130 - 139MM HG: CPT | Performed by: FAMILY MEDICINE

## 2022-02-09 PROCEDURE — 3008F BODY MASS INDEX DOCD: CPT | Performed by: FAMILY MEDICINE

## 2022-02-09 RX ORDER — TADALAFIL 20 MG/1
20 TABLET ORAL AS NEEDED
Qty: 24 TABLET | Refills: 0 | Status: SHIPPED | OUTPATIENT
Start: 2022-02-09

## 2022-02-25 ENCOUNTER — TELEPHONE (OUTPATIENT)
Dept: FAMILY MEDICINE CLINIC | Facility: CLINIC | Age: 52
End: 2022-02-25

## 2022-03-04 ENCOUNTER — HOSPITAL ENCOUNTER (OUTPATIENT)
Age: 52
Discharge: ANOTHER HOSPITAL | End: 2022-03-04
Payer: COMMERCIAL

## 2022-03-04 ENCOUNTER — HOSPITAL ENCOUNTER (INPATIENT)
Facility: HOSPITAL | Age: 52
LOS: 1 days | Discharge: HOME | DRG: 310 | End: 2022-03-06
Attending: EMERGENCY MEDICINE | Admitting: HOSPITALIST
Payer: COMMERCIAL

## 2022-03-04 ENCOUNTER — APPOINTMENT (OUTPATIENT)
Dept: GENERAL RADIOLOGY | Facility: HOSPITAL | Age: 52
DRG: 310 | End: 2022-03-04
Attending: EMERGENCY MEDICINE
Payer: COMMERCIAL

## 2022-03-04 VITALS
RESPIRATION RATE: 22 BRPM | OXYGEN SATURATION: 98 % | DIASTOLIC BLOOD PRESSURE: 85 MMHG | SYSTOLIC BLOOD PRESSURE: 122 MMHG | HEART RATE: 70 BPM | TEMPERATURE: 98 F

## 2022-03-04 DIAGNOSIS — I47.1 SVT (SUPRAVENTRICULAR TACHYCARDIA) (HCC): Primary | ICD-10-CM

## 2022-03-04 PROBLEM — R79.89 AZOTEMIA: Status: ACTIVE | Noted: 2022-03-04

## 2022-03-04 PROBLEM — I47.10 SVT (SUPRAVENTRICULAR TACHYCARDIA): Status: ACTIVE | Noted: 2022-03-04

## 2022-03-04 PROBLEM — I47.10 SVT (SUPRAVENTRICULAR TACHYCARDIA) (HCC): Status: ACTIVE | Noted: 2022-03-04

## 2022-03-04 LAB
ALBUMIN SERPL-MCNC: 3.7 G/DL (ref 3.4–5)
ALBUMIN/GLOB SERPL: 1.2 {RATIO} (ref 1–2)
ALP LIVER SERPL-CCNC: 63 U/L
ANION GAP SERPL CALC-SCNC: 1 MMOL/L (ref 0–18)
AST SERPL-CCNC: 14 U/L (ref 15–37)
BASOPHILS # BLD AUTO: 0.04 X10(3) UL (ref 0–0.2)
BASOPHILS NFR BLD AUTO: 0.6 %
BILIRUB SERPL-MCNC: 0.3 MG/DL (ref 0.1–2)
BUN BLD-MCNC: 23 MG/DL (ref 7–18)
CALCIUM BLD-MCNC: 8.6 MG/DL (ref 8.5–10.1)
CHLORIDE SERPL-SCNC: 111 MMOL/L (ref 98–112)
CO2 SERPL-SCNC: 29 MMOL/L (ref 21–32)
CREAT BLD-MCNC: 1.03 MG/DL
EOSINOPHIL # BLD AUTO: 0.11 X10(3) UL (ref 0–0.7)
EOSINOPHIL NFR BLD AUTO: 1.7 %
ERYTHROCYTE [DISTWIDTH] IN BLOOD BY AUTOMATED COUNT: 12.7 %
GLOBULIN PLAS-MCNC: 3 G/DL (ref 2.8–4.4)
GLUCOSE BLD-MCNC: 99 MG/DL (ref 70–99)
HCT VFR BLD AUTO: 39.8 %
HGB BLD-MCNC: 13.6 G/DL
IMM GRANULOCYTES # BLD AUTO: 0.01 X10(3) UL (ref 0–1)
IMM GRANULOCYTES NFR BLD: 0.2 %
LYMPHOCYTES # BLD AUTO: 1.5 X10(3) UL (ref 1–4)
LYMPHOCYTES NFR BLD AUTO: 23.7 %
MCH RBC QN AUTO: 30.3 PG (ref 26–34)
MCHC RBC AUTO-ENTMCNC: 34.2 G/DL (ref 31–37)
MCV RBC AUTO: 88.6 FL
MONOCYTES # BLD AUTO: 0.46 X10(3) UL (ref 0.1–1)
MONOCYTES NFR BLD AUTO: 7.3 %
NEUTROPHILS # BLD AUTO: 4.21 X10 (3) UL (ref 1.5–7.7)
NEUTROPHILS # BLD AUTO: 4.21 X10(3) UL (ref 1.5–7.7)
NEUTROPHILS NFR BLD AUTO: 66.5 %
OSMOLALITY SERPL CALC.SUM OF ELEC: 296 MOSM/KG (ref 275–295)
PLATELET # BLD AUTO: 207 10(3)UL (ref 150–450)
POTASSIUM SERPL-SCNC: 3.9 MMOL/L (ref 3.5–5.1)
PROT SERPL-MCNC: 6.7 G/DL (ref 6.4–8.2)
RBC # BLD AUTO: 4.49 X10(6)UL
SARS-COV-2 RNA RESP QL NAA+PROBE: NOT DETECTED
SODIUM SERPL-SCNC: 141 MMOL/L (ref 136–145)
TSI SER-ACNC: 1.99 MIU/ML (ref 0.36–3.74)
WBC # BLD AUTO: 6.3 X10(3) UL (ref 4–11)

## 2022-03-04 PROCEDURE — 99205 OFFICE O/P NEW HI 60 MIN: CPT | Performed by: PHYSICIAN ASSISTANT

## 2022-03-04 PROCEDURE — 3E033RZ INTRODUCTION OF ANTIARRHYTHMIC INTO PERIPHERAL VEIN, PERCUTANEOUS APPROACH: ICD-10-PCS | Performed by: EMERGENCY MEDICINE

## 2022-03-04 PROCEDURE — 93000 ELECTROCARDIOGRAM COMPLETE: CPT | Performed by: PHYSICIAN ASSISTANT

## 2022-03-04 PROCEDURE — 99223 1ST HOSP IP/OBS HIGH 75: CPT | Performed by: HOSPITALIST

## 2022-03-04 PROCEDURE — 71045 X-RAY EXAM CHEST 1 VIEW: CPT | Performed by: EMERGENCY MEDICINE

## 2022-03-04 RX ORDER — ADENOSINE 3 MG/ML
INJECTION, SOLUTION INTRAVENOUS
Status: COMPLETED
Start: 2022-03-04 | End: 2022-03-04

## 2022-03-04 RX ORDER — ADENOSINE 3 MG/ML
6 INJECTION, SOLUTION INTRAVENOUS ONCE
Status: COMPLETED | OUTPATIENT
Start: 2022-03-04 | End: 2022-03-04

## 2022-03-04 RX ORDER — SODIUM CHLORIDE 9 MG/ML
INJECTION, SOLUTION INTRAVENOUS ONCE
Status: DISCONTINUED | OUTPATIENT
Start: 2022-03-04 | End: 2022-03-04

## 2022-03-05 ENCOUNTER — APPOINTMENT (OUTPATIENT)
Dept: CV DIAGNOSTICS | Facility: HOSPITAL | Age: 52
DRG: 310 | End: 2022-03-05
Attending: HOSPITALIST
Payer: COMMERCIAL

## 2022-03-05 LAB
ANION GAP SERPL CALC-SCNC: 1 MMOL/L (ref 0–18)
BASOPHILS # BLD AUTO: 0.05 X10(3) UL (ref 0–0.2)
BASOPHILS NFR BLD AUTO: 0.7 %
BUN BLD-MCNC: 23 MG/DL (ref 7–18)
CALCIUM BLD-MCNC: 8.7 MG/DL (ref 8.5–10.1)
CHLORIDE SERPL-SCNC: 110 MMOL/L (ref 98–112)
CO2 SERPL-SCNC: 29 MMOL/L (ref 21–32)
CREAT BLD-MCNC: 1 MG/DL
EOSINOPHIL # BLD AUTO: 0.19 X10(3) UL (ref 0–0.7)
EOSINOPHIL NFR BLD AUTO: 2.8 %
ERYTHROCYTE [DISTWIDTH] IN BLOOD BY AUTOMATED COUNT: 12.9 %
GLUCOSE BLD-MCNC: 107 MG/DL (ref 70–99)
GLUCOSE BLD-MCNC: 111 MG/DL (ref 70–99)
GLUCOSE BLD-MCNC: 133 MG/DL (ref 70–99)
GLUCOSE BLD-MCNC: 155 MG/DL (ref 70–99)
GLUCOSE BLD-MCNC: 90 MG/DL (ref 70–99)
GLUCOSE BLD-MCNC: 95 MG/DL (ref 70–99)
HCT VFR BLD AUTO: 39.4 %
HGB BLD-MCNC: 12.7 G/DL
IMM GRANULOCYTES # BLD AUTO: 0.02 X10(3) UL (ref 0–1)
IMM GRANULOCYTES NFR BLD: 0.3 %
LYMPHOCYTES # BLD AUTO: 1.54 X10(3) UL (ref 1–4)
LYMPHOCYTES NFR BLD AUTO: 23 %
MAGNESIUM SERPL-MCNC: 2.4 MG/DL (ref 1.6–2.6)
MCH RBC QN AUTO: 29.2 PG (ref 26–34)
MCHC RBC AUTO-ENTMCNC: 32.2 G/DL (ref 31–37)
MCV RBC AUTO: 90.6 FL
MONOCYTES # BLD AUTO: 0.77 X10(3) UL (ref 0.1–1)
MONOCYTES NFR BLD AUTO: 11.5 %
NEUTROPHILS # BLD AUTO: 4.13 X10(3) UL (ref 1.5–7.7)
NEUTROPHILS NFR BLD AUTO: 61.7 %
OSMOLALITY SERPL CALC.SUM OF ELEC: 296 MOSM/KG (ref 275–295)
PLATELET # BLD AUTO: 211 10(3)UL (ref 150–450)
POTASSIUM SERPL-SCNC: 3.9 MMOL/L (ref 3.5–5.1)
RBC # BLD AUTO: 4.35 X10(6)UL
SODIUM SERPL-SCNC: 140 MMOL/L (ref 136–145)
WBC # BLD AUTO: 6.7 X10(3) UL (ref 4–11)

## 2022-03-05 PROCEDURE — 5A09357 ASSISTANCE WITH RESPIRATORY VENTILATION, LESS THAN 24 CONSECUTIVE HOURS, CONTINUOUS POSITIVE AIRWAY PRESSURE: ICD-10-PCS | Performed by: HOSPITALIST

## 2022-03-05 PROCEDURE — 93306 TTE W/DOPPLER COMPLETE: CPT | Performed by: HOSPITALIST

## 2022-03-05 PROCEDURE — 99291 CRITICAL CARE FIRST HOUR: CPT | Performed by: HOSPITALIST

## 2022-03-05 PROCEDURE — 99232 SBSQ HOSP IP/OBS MODERATE 35: CPT | Performed by: HOSPITALIST

## 2022-03-05 RX ORDER — ATORVASTATIN CALCIUM 20 MG/1
20 TABLET, FILM COATED ORAL NIGHTLY
Status: DISCONTINUED | OUTPATIENT
Start: 2022-03-05 | End: 2022-03-06

## 2022-03-05 RX ORDER — VALSARTAN AND HYDROCHLOROTHIAZIDE 80; 12.5 MG/1; MG/1
1 TABLET, FILM COATED ORAL DAILY
Status: DISCONTINUED | OUTPATIENT
Start: 2022-03-05 | End: 2022-03-05 | Stop reason: RX

## 2022-03-05 RX ORDER — NICOTINE POLACRILEX 4 MG
15 LOZENGE BUCCAL
Status: DISCONTINUED | OUTPATIENT
Start: 2022-03-04 | End: 2022-03-06

## 2022-03-05 RX ORDER — DEXTROSE MONOHYDRATE 25 G/50ML
50 INJECTION, SOLUTION INTRAVENOUS
Status: DISCONTINUED | OUTPATIENT
Start: 2022-03-04 | End: 2022-03-06

## 2022-03-05 RX ORDER — PROCHLORPERAZINE EDISYLATE 5 MG/ML
5 INJECTION INTRAMUSCULAR; INTRAVENOUS EVERY 8 HOURS PRN
Status: DISCONTINUED | OUTPATIENT
Start: 2022-03-05 | End: 2022-03-06

## 2022-03-05 RX ORDER — ASPIRIN 81 MG/1
81 TABLET, CHEWABLE ORAL DAILY
Status: DISCONTINUED | OUTPATIENT
Start: 2022-03-05 | End: 2022-03-06

## 2022-03-05 RX ORDER — ADENOSINE 3 MG/ML
INJECTION, SOLUTION INTRAVENOUS
Status: DISCONTINUED
Start: 2022-03-05 | End: 2022-03-05 | Stop reason: WASHOUT

## 2022-03-05 RX ORDER — NICOTINE POLACRILEX 4 MG
30 LOZENGE BUCCAL
Status: DISCONTINUED | OUTPATIENT
Start: 2022-03-04 | End: 2022-03-06

## 2022-03-05 RX ORDER — ACETAMINOPHEN 325 MG/1
650 TABLET ORAL EVERY 6 HOURS PRN
Status: DISCONTINUED | OUTPATIENT
Start: 2022-03-05 | End: 2022-03-06

## 2022-03-05 RX ORDER — ENOXAPARIN SODIUM 100 MG/ML
40 INJECTION SUBCUTANEOUS DAILY
Status: DISCONTINUED | OUTPATIENT
Start: 2022-03-05 | End: 2022-03-06

## 2022-03-05 RX ORDER — ONDANSETRON 2 MG/ML
4 INJECTION INTRAMUSCULAR; INTRAVENOUS EVERY 6 HOURS PRN
Status: DISCONTINUED | OUTPATIENT
Start: 2022-03-05 | End: 2022-03-06

## 2022-03-05 NOTE — ED INITIAL ASSESSMENT (HPI)
Patient started earlier today with a feeling of a rapid heart rate and then it reset. He started feeling it again tonight and now has this feeling again. He has been lightheaded as well.

## 2022-03-05 NOTE — ED QUICK NOTES
Ambulance called for transport to [facility]. 25 minutes ETA. Patient cardioverted and stable at this time for transport.

## 2022-03-05 NOTE — ED QUICK NOTES
Pt was sent from urgent care for svt; pt was given adenosine 6mg and cardioverted; pt is stable at this time; denies cp, sob

## 2022-03-05 NOTE — ED QUICK NOTES
Pt appears to be in SVT again, Dr. Nikkie Hartman notified. Cardioversion techniques attempted, unsuccessful. Adenosine ordered and administered. Pt now appears to be in NSR again. Multiple ECGs obtained to demonstrated events. Pt is calm and comfortable at this time, monitors still in placed, bed in low position, call light given.

## 2022-03-05 NOTE — PLAN OF CARE
Patient is alert and oriented. On room air, NSR on tele. Denies any chest pain or SOB. Plan for echocardiogram, and cardiology to see. POC updated with the patient, all questions answered. Call light within reach, will continue to monitor. 01:48 Rapid Response called patient converted to SVT with a rate of 160-170's. Patient converted back to normal sinus rhythm after 8 minutes without any intervention.      Problem: Diabetes/Glucose Control  Goal: Glucose maintained within prescribed range  Description: INTERVENTIONS:  - Monitor Blood Glucose as ordered  - Assess for signs and symptoms of hyperglycemia and hypoglycemia  - Administer ordered medications to maintain glucose within target range  - Assess barriers to adequate nutritional intake and initiate nutrition consult as needed  - Instruct patient on self management of diabetes  Outcome: Progressing     Problem: Patient/Family Goals  Goal: Patient/Family Long Term Goal  Description: Patient's Long Term Goal: discharge back home     Interventions:  - discharge planning   - See additional Care Plan goals for specific interventions  Outcome: Progressing  Goal: Patient/Family Short Term Goal  Description: Patient's Short Term Goal: no cardiac events     Interventions:   - monitor on tele, cardiology to see  - See additional Care Plan goals for specific interventions  Outcome: Progressing

## 2022-03-05 NOTE — PLAN OF CARE
Assumed care for patient at 0700. AOx4. Independent. Up ad haleigh. NSR on cardiac monitor. Adequate saturation on RA. Lung sounds clear. Denies sob, chest discomfort, n/v.  Denies pain. Voiding without difficulty.     Plan is for echocardiogram and cardiology to see   (KAREN LEMUS aware of consult)      Problem: Diabetes/Glucose Control  Goal: Glucose maintained within prescribed range  Description: INTERVENTIONS:  - Monitor Blood Glucose as ordered  - Assess for signs and symptoms of hyperglycemia and hypoglycemia  - Administer ordered medications to maintain glucose within target range  - Assess barriers to adequate nutritional intake and initiate nutrition consult as needed  - Instruct patient on self management of diabetes

## 2022-03-05 NOTE — ED QUICK NOTES
Orders for admission, patient is aware of plan and ready to go upstairs. Any questions, please call ED RN Governor Stands at extension [phone]    Vaccinated? Yes  Type of COVID test sent: Rapid  COVID Suspicion level: Low      Titratable drug(s) infusing:   Rate: N/A    LOC at time of transport: A&O x4    Other pertinent information: Pt had 2 rounds of SVT tonight, both converted with 6mg of adenosine. Pt is NSR currently. Denies this happening before.       CIWA score=  NIH score=

## 2022-03-06 VITALS
WEIGHT: 260.69 LBS | DIASTOLIC BLOOD PRESSURE: 71 MMHG | OXYGEN SATURATION: 97 % | HEART RATE: 61 BPM | SYSTOLIC BLOOD PRESSURE: 124 MMHG | BODY MASS INDEX: 35.31 KG/M2 | HEIGHT: 72 IN | RESPIRATION RATE: 20 BRPM | TEMPERATURE: 98 F

## 2022-03-06 LAB — GLUCOSE BLD-MCNC: 108 MG/DL (ref 70–99)

## 2022-03-06 PROCEDURE — 99239 HOSP IP/OBS DSCHRG MGMT >30: CPT | Performed by: HOSPITALIST

## 2022-03-06 RX ORDER — METOPROLOL SUCCINATE 25 MG/1
25 TABLET, EXTENDED RELEASE ORAL DAILY
Qty: 30 TABLET | Refills: 5 | Status: SHIPPED | OUTPATIENT
Start: 2022-03-06

## 2022-03-06 NOTE — PROGRESS NOTES
Patient seen and examined. Medically clear to discharge today. Gen: NAD  Resp: CTA  CVS: s1s2  Abd: soft, +bowel sounds  Neck: trachea is midline      SVT: stable on BB. Echo WNL. F/u EP as OP  Diet controlled DM: not on meds. Pt to cont diet and wt loss  HTN: stable on BB.    SURENDRA: cont CPAP. 35 minutes spent on discharge planning.       Kimi Maldonado MD

## 2022-03-06 NOTE — PLAN OF CARE
Assumed care around 1930. A/Ox4. On RA w/ sats >90. Monitor shows NSR. Voids in BR. No reports of pain. Up ad haleigh. Plan to monitor overnight and home today if NS is maintained and no SVT presents. Safety precautions in place, call light within reach.      Problem: Diabetes/Glucose Control  Goal: Glucose maintained within prescribed range  Description: INTERVENTIONS:  - Monitor Blood Glucose as ordered  - Assess for signs and symptoms of hyperglycemia and hypoglycemia  - Administer ordered medications to maintain glucose within target range  - Assess barriers to adequate nutritional intake and initiate nutrition consult as needed  - Instruct patient on self management of diabetes  Outcome: Progressing

## 2022-03-06 NOTE — PLAN OF CARE
Assumed patient care at 0730. Vital signs stable. Patient alert and oriented x 4. Patient denies pain. No arrhythmias on tele. Plans for discharge today.      Problem: Diabetes/Glucose Control  Goal: Glucose maintained within prescribed range  Description: INTERVENTIONS:  - Monitor Blood Glucose as ordered  - Assess for signs and symptoms of hyperglycemia and hypoglycemia  - Administer ordered medications to maintain glucose within target range  - Assess barriers to adequate nutritional intake and initiate nutrition consult as needed  - Instruct patient on self management of diabetes  Outcome: Progressing

## 2022-03-06 NOTE — PLAN OF CARE
Discharge orders received. IV and tele discontinued. Discharge instructions reviewed with patient and his wife - both verbalized understanding. Patient declined wheelchair to lobby. Patient discharged home.

## 2022-03-07 LAB
ATRIAL RATE: 69 BPM
P AXIS: 42 DEGREES
P-R INTERVAL: 152 MS
Q-T INTERVAL: 402 MS
QRS DURATION: 110 MS
QTC CALCULATION (BEZET): 430 MS
R AXIS: 43 DEGREES
T AXIS: 21 DEGREES
VENTRICULAR RATE: 69 BPM

## 2022-03-07 NOTE — PAYOR COMM NOTE
--------------  DISCHARGE REVIEW    Payor: [facility]  Subscriber #:  [id number]  Authorization Number: [id number]    Admit date: 3/5/22  Admit time:   9:42 AM  Discharge Date: 3/6/2022 11:18 AM     Admitting Physician: Chelsea Price MD  Attending Physician:  No att. providers found  Primary Care Physician: Nedra Salomon MD       Discharge Summary Notes    No notes of this type exist for this encounter.

## 2022-03-08 ENCOUNTER — PATIENT OUTREACH (OUTPATIENT)
Dept: CASE MANAGEMENT | Age: 52
End: 2022-03-08

## 2022-03-08 LAB
ATRIAL RATE: 159 BPM
ATRIAL RATE: 166 BPM
ATRIAL RATE: 192 BPM
ATRIAL RATE: 66 BPM
ATRIAL RATE: 68 BPM
ATRIAL RATE: 73 BPM
P AXIS: 43 DEGREES
P AXIS: 57 DEGREES
P AXIS: 8 DEGREES
P-R INTERVAL: 120 MS
P-R INTERVAL: 136 MS
P-R INTERVAL: 140 MS
P-R INTERVAL: 154 MS
P-R INTERVAL: 184 MS
Q-T INTERVAL: 200 MS
Q-T INTERVAL: 246 MS
Q-T INTERVAL: 270 MS
Q-T INTERVAL: 282 MS
Q-T INTERVAL: 340 MS
Q-T INTERVAL: 380 MS
Q-T INTERVAL: 392 MS
QRS DURATION: 102 MS
QRS DURATION: 110 MS
QRS DURATION: 110 MS
QRS DURATION: 114 MS
QRS DURATION: 140 MS
QRS DURATION: 148 MS
QRS DURATION: 96 MS
QTC CALCULATION (BEZET): 356 MS
QTC CALCULATION (BEZET): 375 MS
QTC CALCULATION (BEZET): 416 MS
QTC CALCULATION (BEZET): 418 MS
QTC CALCULATION (BEZET): 443 MS
QTC CALCULATION (BEZET): 448 MS
QTC CALCULATION (BEZET): 458 MS
R AXIS: -38 DEGREES
R AXIS: -48 DEGREES
R AXIS: 39 DEGREES
R AXIS: 48 DEGREES
R AXIS: 60 DEGREES
R AXIS: 65 DEGREES
R AXIS: 77 DEGREES
T AXIS: 11 DEGREES
T AXIS: 12 DEGREES
T AXIS: 127 DEGREES
T AXIS: 133 DEGREES
T AXIS: 242 DEGREES
T AXIS: 249 DEGREES
T AXIS: 9 DEGREES
VENTRICULAR RATE: 159 BPM
VENTRICULAR RATE: 166 BPM
VENTRICULAR RATE: 212 BPM
VENTRICULAR RATE: 66 BPM
VENTRICULAR RATE: 68 BPM
VENTRICULAR RATE: 73 BPM

## 2022-06-28 ENCOUNTER — HOSPITAL ENCOUNTER (INPATIENT)
Facility: HOSPITAL | Age: 52
LOS: 1 days | Discharge: HOME | End: 2022-06-29
Attending: EMERGENCY MEDICINE | Admitting: INTERNAL MEDICINE
Payer: COMMERCIAL

## 2022-06-28 DIAGNOSIS — I47.1 SVT (SUPRAVENTRICULAR TACHYCARDIA) (HCC): Primary | ICD-10-CM

## 2022-06-28 DIAGNOSIS — I48.91 ATRIAL FIBRILLATION WITH RAPID VENTRICULAR RESPONSE (HCC): ICD-10-CM

## 2022-06-28 LAB
ALBUMIN SERPL-MCNC: 4 G/DL (ref 3.4–5)
ALBUMIN/GLOB SERPL: 1.2 {RATIO} (ref 1–2)
ALP LIVER SERPL-CCNC: 83 U/L
ALT SERPL-CCNC: 40 U/L
ANION GAP SERPL CALC-SCNC: 5 MMOL/L (ref 0–18)
APTT PPP: 26.7 SECONDS (ref 23.3–35.6)
AST SERPL-CCNC: 18 U/L (ref 15–37)
BASOPHILS # BLD AUTO: 0.03 X10(3) UL (ref 0–0.2)
BASOPHILS NFR BLD AUTO: 0.4 %
BILIRUB SERPL-MCNC: 0.7 MG/DL (ref 0.1–2)
BUN BLD-MCNC: 26 MG/DL (ref 7–18)
CALCIUM BLD-MCNC: 9.3 MG/DL (ref 8.5–10.1)
CHLORIDE SERPL-SCNC: 108 MMOL/L (ref 98–112)
CO2 SERPL-SCNC: 27 MMOL/L (ref 21–32)
CREAT BLD-MCNC: 1.53 MG/DL
D DIMER PPP FEU-MCNC: <0.27 UG/ML FEU (ref ?–0.52)
EOSINOPHIL # BLD AUTO: 0.19 X10(3) UL (ref 0–0.7)
EOSINOPHIL NFR BLD AUTO: 2.2 %
ERYTHROCYTE [DISTWIDTH] IN BLOOD BY AUTOMATED COUNT: 12.8 %
EST. AVERAGE GLUCOSE BLD GHB EST-MCNC: 143 MG/DL (ref 68–126)
GLOBULIN PLAS-MCNC: 3.4 G/DL (ref 2.8–4.4)
GLUCOSE BLD-MCNC: 117 MG/DL (ref 70–99)
GLUCOSE BLD-MCNC: 127 MG/DL (ref 70–99)
HBA1C MFR BLD: 6.6 % (ref ?–5.7)
HCT VFR BLD AUTO: 47.4 %
HGB BLD-MCNC: 15.3 G/DL
IMM GRANULOCYTES # BLD AUTO: 0.03 X10(3) UL (ref 0–1)
IMM GRANULOCYTES NFR BLD: 0.4 %
LYMPHOCYTES # BLD AUTO: 2.04 X10(3) UL (ref 1–4)
LYMPHOCYTES NFR BLD AUTO: 24 %
MCH RBC QN AUTO: 29.5 PG (ref 26–34)
MCHC RBC AUTO-ENTMCNC: 32.3 G/DL (ref 31–37)
MCV RBC AUTO: 91.3 FL
MONOCYTES # BLD AUTO: 0.63 X10(3) UL (ref 0.1–1)
MONOCYTES NFR BLD AUTO: 7.4 %
NEUTROPHILS # BLD AUTO: 5.57 X10 (3) UL (ref 1.5–7.7)
NEUTROPHILS # BLD AUTO: 5.57 X10(3) UL (ref 1.5–7.7)
NEUTROPHILS NFR BLD AUTO: 65.6 %
OSMOLALITY SERPL CALC.SUM OF ELEC: 296 MOSM/KG (ref 275–295)
PLATELET # BLD AUTO: 255 10(3)UL (ref 150–450)
POTASSIUM SERPL-SCNC: 3.9 MMOL/L (ref 3.5–5.1)
PROT SERPL-MCNC: 7.4 G/DL (ref 6.4–8.2)
RBC # BLD AUTO: 5.19 X10(6)UL
SARS-COV-2 RNA RESP QL NAA+PROBE: NOT DETECTED
SODIUM SERPL-SCNC: 140 MMOL/L (ref 136–145)
WBC # BLD AUTO: 8.5 X10(3) UL (ref 4–11)

## 2022-06-28 PROCEDURE — 99223 1ST HOSP IP/OBS HIGH 75: CPT | Performed by: HOSPITALIST

## 2022-06-28 RX ORDER — ONDANSETRON 2 MG/ML
4 INJECTION INTRAMUSCULAR; INTRAVENOUS EVERY 6 HOURS PRN
Status: DISCONTINUED | OUTPATIENT
Start: 2022-06-28 | End: 2022-06-29

## 2022-06-28 RX ORDER — ADENOSINE 3 MG/ML
6 INJECTION, SOLUTION INTRAVENOUS ONCE
Status: COMPLETED | OUTPATIENT
Start: 2022-06-28 | End: 2022-06-28

## 2022-06-28 RX ORDER — ATORVASTATIN CALCIUM 20 MG/1
20 TABLET, FILM COATED ORAL NIGHTLY
Status: DISCONTINUED | OUTPATIENT
Start: 2022-06-28 | End: 2022-06-29

## 2022-06-28 RX ORDER — PROCHLORPERAZINE EDISYLATE 5 MG/ML
5 INJECTION INTRAMUSCULAR; INTRAVENOUS EVERY 8 HOURS PRN
Status: DISCONTINUED | OUTPATIENT
Start: 2022-06-28 | End: 2022-06-29

## 2022-06-28 RX ORDER — ADENOSINE 3 MG/ML
INJECTION, SOLUTION INTRAVENOUS
Status: COMPLETED
Start: 2022-06-28 | End: 2022-06-28

## 2022-06-28 RX ORDER — METOPROLOL SUCCINATE 25 MG/1
25 TABLET, EXTENDED RELEASE ORAL
Status: DISCONTINUED | OUTPATIENT
Start: 2022-06-29 | End: 2022-06-29

## 2022-06-28 RX ORDER — HEPARIN SODIUM AND DEXTROSE 10000; 5 [USP'U]/100ML; G/100ML
1000 INJECTION INTRAVENOUS ONCE
Status: COMPLETED | OUTPATIENT
Start: 2022-06-28 | End: 2022-06-29

## 2022-06-28 RX ORDER — HEPARIN SODIUM AND DEXTROSE 10000; 5 [USP'U]/100ML; G/100ML
INJECTION INTRAVENOUS CONTINUOUS
Status: DISCONTINUED | OUTPATIENT
Start: 2022-06-29 | End: 2022-06-29

## 2022-06-28 RX ORDER — DEXTROSE MONOHYDRATE 25 G/50ML
50 INJECTION, SOLUTION INTRAVENOUS
Status: DISCONTINUED | OUTPATIENT
Start: 2022-06-28 | End: 2022-06-29

## 2022-06-28 RX ORDER — HEPARIN SODIUM 1000 [USP'U]/ML
5000 INJECTION, SOLUTION INTRAVENOUS; SUBCUTANEOUS ONCE
Status: COMPLETED | OUTPATIENT
Start: 2022-06-28 | End: 2022-06-29

## 2022-06-28 RX ORDER — NICOTINE POLACRILEX 4 MG
30 LOZENGE BUCCAL
Status: DISCONTINUED | OUTPATIENT
Start: 2022-06-28 | End: 2022-06-29

## 2022-06-28 RX ORDER — FLUTICASONE PROPIONATE 50 MCG
2 SPRAY, SUSPENSION (ML) NASAL DAILY
Status: DISCONTINUED | OUTPATIENT
Start: 2022-06-28 | End: 2022-06-29

## 2022-06-28 RX ORDER — SODIUM CHLORIDE 9 MG/ML
INJECTION, SOLUTION INTRAVENOUS CONTINUOUS
Status: DISCONTINUED | OUTPATIENT
Start: 2022-06-28 | End: 2022-06-29

## 2022-06-28 RX ORDER — NICOTINE POLACRILEX 4 MG
15 LOZENGE BUCCAL
Status: DISCONTINUED | OUTPATIENT
Start: 2022-06-28 | End: 2022-06-29

## 2022-06-28 RX ORDER — DILTIAZEM HYDROCHLORIDE 5 MG/ML
10 INJECTION INTRAVENOUS ONCE
Status: DISCONTINUED | OUTPATIENT
Start: 2022-06-28 | End: 2022-06-29

## 2022-06-29 VITALS
RESPIRATION RATE: 18 BRPM | OXYGEN SATURATION: 99 % | DIASTOLIC BLOOD PRESSURE: 76 MMHG | BODY MASS INDEX: 36 KG/M2 | TEMPERATURE: 98 F | SYSTOLIC BLOOD PRESSURE: 130 MMHG | WEIGHT: 263.25 LBS | HEART RATE: 71 BPM

## 2022-06-29 LAB
ANION GAP SERPL CALC-SCNC: 6 MMOL/L (ref 0–18)
APTT PPP: 38.9 SECONDS (ref 23.3–35.6)
ATRIAL RATE: 175 BPM
ATRIAL RATE: 70 BPM
ATRIAL RATE: 96 BPM
BUN BLD-MCNC: 23 MG/DL (ref 7–18)
CALCIUM BLD-MCNC: 8.8 MG/DL (ref 8.5–10.1)
CHLORIDE SERPL-SCNC: 109 MMOL/L (ref 98–112)
CO2 SERPL-SCNC: 26 MMOL/L (ref 21–32)
CREAT BLD-MCNC: 1.1 MG/DL
ERYTHROCYTE [DISTWIDTH] IN BLOOD BY AUTOMATED COUNT: 13.2 %
GLUCOSE BLD-MCNC: 118 MG/DL (ref 70–99)
GLUCOSE BLD-MCNC: 135 MG/DL (ref 70–99)
GLUCOSE BLD-MCNC: 142 MG/DL (ref 70–99)
HCT VFR BLD AUTO: 45.9 %
HGB BLD-MCNC: 14.5 G/DL
MCH RBC QN AUTO: 29.4 PG (ref 26–34)
MCHC RBC AUTO-ENTMCNC: 31.6 G/DL (ref 31–37)
MCV RBC AUTO: 93.1 FL
OSMOLALITY SERPL CALC.SUM OF ELEC: 298 MOSM/KG (ref 275–295)
P AXIS: 10 DEGREES
P-R INTERVAL: 140 MS
PLATELET # BLD AUTO: 222 10(3)UL (ref 150–450)
PLATELET # BLD AUTO: 222 10(3)UL (ref 150–450)
POTASSIUM SERPL-SCNC: 4.1 MMOL/L (ref 3.5–5.1)
Q-T INTERVAL: 254 MS
Q-T INTERVAL: 328 MS
Q-T INTERVAL: 392 MS
QRS DURATION: 100 MS
QRS DURATION: 108 MS
QRS DURATION: 130 MS
QTC CALCULATION (BEZET): 423 MS
QTC CALCULATION (BEZET): 434 MS
QTC CALCULATION (BEZET): 478 MS
R AXIS: -43 DEGREES
R AXIS: 32 DEGREES
R AXIS: 51 DEGREES
RBC # BLD AUTO: 4.93 X10(6)UL
SODIUM SERPL-SCNC: 141 MMOL/L (ref 136–145)
T AXIS: 10 DEGREES
T AXIS: 125 DEGREES
T AXIS: 15 DEGREES
VENTRICULAR RATE: 128 BPM
VENTRICULAR RATE: 176 BPM
VENTRICULAR RATE: 70 BPM
WBC # BLD AUTO: 6.9 X10(3) UL (ref 4–11)

## 2022-06-29 PROCEDURE — 99239 HOSP IP/OBS DSCHRG MGMT >30: CPT | Performed by: HOSPITALIST

## 2022-06-29 RX ORDER — METOPROLOL SUCCINATE 50 MG/1
50 TABLET, EXTENDED RELEASE ORAL
Status: DISCONTINUED | OUTPATIENT
Start: 2022-06-30 | End: 2022-06-29

## 2022-06-29 RX ORDER — METOPROLOL SUCCINATE 25 MG/1
50 TABLET, EXTENDED RELEASE ORAL NIGHTLY
Status: SHIPPED | COMMUNITY
Start: 2022-06-29

## 2022-06-29 NOTE — PLAN OF CARE
Reviewed EKG with Dr. Cathy Freeman, likely consistent with SVT. Consultation with Dr. Cathy Freeman on 6/27/22 with plans for OP ablation August/September. Cont increased dose of BB. Hold off on DOAC as this afib was likely 2/2 adenosine administration and patient has low chadsvasc score. If maintaining nsr and no further episodes this afternoon, ok to dc from cardiology standpoint. Reviewed plan of care with patient, RN, Dr. Cathy Freeman, and Dr. Brad Rojo.     Ibeth Burr, APRN  6/29/2022  1:32 PM

## 2022-06-29 NOTE — ED QUICK NOTES
Orders for admission, patient is aware of plan and ready to go upstairs. Any questions, please call ED RN Donald Byrd at extension [phone]. Vaccinated?  Yes  Type of COVID test sent: Rapid - Negative  COVID Suspicion level: Low      Titratable drug(s) infusing: Cardizem  Rate: 10mg/hr    LOC at time of transport: A&Ox4    Other pertinent information:    CIWA score=  NIH score=

## 2022-06-29 NOTE — PLAN OF CARE
Assumed care of patient from ER. Patient AOX4. O2 sat > 90% on room air while awake, compliant with CPAP while asleep. A-fib on tele, rates controlled with Cardizem gtt. IV fluids and Heparin gtt infusing per orders. Cardiology to see in a.m. VSS. Patient and patient's wife updated on plan of care. Problem: CARDIOVASCULAR - ADULT  Goal: Maintains optimal cardiac output and hemodynamic stability  Description: INTERVENTIONS:  - Monitor vital signs, rhythm, and trends  - Monitor for bleeding, hypotension and signs of decreased cardiac output  - Evaluate effectiveness of vasoactive medications to optimize hemodynamic stability  - Monitor arterial and/or venous puncture sites for bleeding and/or hematoma  - Assess quality of pulses, skin color and temperature  - Assess for signs of decreased coronary artery perfusion - ex.  Angina  - Evaluate fluid balance, assess for edema, trend weights  Outcome: Progressing  Goal: Absence of cardiac arrhythmias or at baseline  Description: INTERVENTIONS:  - Continuous cardiac monitoring, monitor vital signs, obtain 12 lead EKG if indicated  - Evaluate effectiveness of antiarrhythmic and heart rate control medications as ordered  - Initiate emergency measures for life threatening arrhythmias  - Monitor electrolytes and administer replacement therapy as ordered  Outcome: Progressing     Problem: Patient/Family Goals  Goal: Patient/Family Long Term Goal  Description: Patient's Long Term Goal: stay healthy at home    Interventions:  --take medications as prescribed  -attend follow up appointments as recommended  -diet and exercise as advised  -report new or worsening symptoms to physician       - See additional Care Plan goals for specific interventions  Outcome: Progressing  Goal: Patient/Family Short Term Goal  Description: Patient's Short Term Goal: heart rate control    Interventions:   -IV cardizem  -cardiology consult  -monitor on tele  -PO metoprolol    - See additional Care Plan goals for specific interventions  Outcome: Progressing     Problem: Diabetes/Glucose Control  Goal: Glucose maintained within prescribed range  Description: INTERVENTIONS:  - Monitor Blood Glucose as ordered  - Assess for signs and symptoms of hyperglycemia and hypoglycemia  - Administer ordered medications to maintain glucose within target range  - Assess barriers to adequate nutritional intake and initiate nutrition consult as needed  - Instruct patient on self management of diabetes  Outcome: Progressing

## 2022-06-29 NOTE — PLAN OF CARE
Assumed care of pt @ 8486. Pt is A/Ox 4. On RA, VSS, SR on tele. Converted to SR at 10:04am  IV saline lock  Tolerating diet. Pt denies pain   Up as tolerated  Intake/outputs WNL. Plan dc later if pt HR remains in SR and rates controlled    Updated POC with patient and family. Will continue to monitor. Problem: CARDIOVASCULAR - ADULT  Goal: Maintains optimal cardiac output and hemodynamic stability  Description: INTERVENTIONS:  - Monitor vital signs, rhythm, and trends  - Monitor for bleeding, hypotension and signs of decreased cardiac output  - Evaluate effectiveness of vasoactive medications to optimize hemodynamic stability  - Monitor arterial and/or venous puncture sites for bleeding and/or hematoma  - Assess quality of pulses, skin color and temperature  - Assess for signs of decreased coronary artery perfusion - ex.  Angina  - Evaluate fluid balance, assess for edema, trend weights  Outcome: Progressing  Goal: Absence of cardiac arrhythmias or at baseline  Description: INTERVENTIONS:  - Continuous cardiac monitoring, monitor vital signs, obtain 12 lead EKG if indicated  - Evaluate effectiveness of antiarrhythmic and heart rate control medications as ordered  - Initiate emergency measures for life threatening arrhythmias  - Monitor electrolytes and administer replacement therapy as ordered  Outcome: Progressing     Problem: Patient/Family Goals  Goal: Patient/Family Long Term Goal  Description: Patient's Long Term Goal: stay healthy at home    Interventions:  --take medications as prescribed  -attend follow up appointments as recommended  -diet and exercise as advised  -report new or worsening symptoms to physician       - See additional Care Plan goals for specific interventions  Outcome: Progressing  Goal: Patient/Family Short Term Goal  Description: Patient's Short Term Goal: heart rate control    Interventions:   -IV cardizem  -cardiology consult  -monitor on tele  -PO metoprolol    - See additional Care Plan goals for specific interventions  Outcome: Progressing     Problem: Diabetes/Glucose Control  Goal: Glucose maintained within prescribed range  Description: INTERVENTIONS:  - Monitor Blood Glucose as ordered  - Assess for signs and symptoms of hyperglycemia and hypoglycemia  - Administer ordered medications to maintain glucose within target range  - Assess barriers to adequate nutritional intake and initiate nutrition consult as needed  - Instruct patient on self management of diabetes  Outcome: Progressing

## 2022-06-29 NOTE — PLAN OF CARE
NURSING DISCHARGE NOTE    Discharged Home via Wheelchair. Accompanied by Spouse and Support staff  Belongings Taken by patient/family. IV and tele removed. Pt and wife updated on med changes made. Aware to f/u with cards in 2 weeks. All questions answered.

## 2022-06-29 NOTE — ED INITIAL ASSESSMENT (HPI)
Patient felt himself go into SVT about an hour ago. Patient attempted to come out of it on his own and took a rescue dose of Cardizem. No sob. No chest pain.

## 2022-06-30 ENCOUNTER — PATIENT OUTREACH (OUTPATIENT)
Dept: CASE MANAGEMENT | Age: 52
End: 2022-06-30

## 2022-07-01 NOTE — PROGRESS NOTES
[facility] contact information provided as well as [facility] office number, [phone].

## 2022-07-18 RX ORDER — ATORVASTATIN CALCIUM 20 MG/1
20 TABLET, FILM COATED ORAL NIGHTLY
Qty: 90 TABLET | Refills: 0 | Status: SHIPPED | OUTPATIENT
Start: 2022-07-18

## 2022-07-18 NOTE — TELEPHONE ENCOUNTER
Last refill 1/4/22 #90 0 refill  Last OV 2/9/22  Future Appointments   Date Time Provider Ghazal Pettit   9/6/2022 11:45 AM [facility]

## 2022-07-18 NOTE — TELEPHONE ENCOUNTER
Rx refill requested     atorvastatin 20 MG Oral Tab     [facility] [address], [address] - [address] AT PORTAL TO [address], [phone], [phone]

## 2022-08-26 ENCOUNTER — PATIENT OUTREACH (OUTPATIENT)
Dept: FAMILY MEDICINE CLINIC | Facility: CLINIC | Age: 52
End: 2022-08-26

## 2022-09-06 ENCOUNTER — LAB ENCOUNTER (OUTPATIENT)
Dept: LAB | Age: 52
End: 2022-09-06
Attending: INTERNAL MEDICINE
Payer: COMMERCIAL

## 2022-09-06 DIAGNOSIS — Z01.812 PRE-OPERATIVE LABORATORY EXAMINATION: Primary | ICD-10-CM

## 2022-09-06 DIAGNOSIS — I47.1 PAROXYSMAL SUPRAVENTRICULAR TACHYCARDIA (HCC): ICD-10-CM

## 2022-09-06 LAB
ANION GAP SERPL CALC-SCNC: 3 MMOL/L (ref 0–18)
BUN BLD-MCNC: 18 MG/DL (ref 7–18)
CALCIUM BLD-MCNC: 9.2 MG/DL (ref 8.5–10.1)
CHLORIDE SERPL-SCNC: 108 MMOL/L (ref 98–112)
CO2 SERPL-SCNC: 27 MMOL/L (ref 21–32)
CREAT BLD-MCNC: 1.02 MG/DL
FASTING STATUS PATIENT QL REPORTED: NO
GFR SERPLBLD BASED ON 1.73 SQ M-ARVRAT: 88 ML/MIN/1.73M2 (ref 60–?)
GLUCOSE BLD-MCNC: 122 MG/DL (ref 70–99)
OSMOLALITY SERPL CALC.SUM OF ELEC: 289 MOSM/KG (ref 275–295)
POTASSIUM SERPL-SCNC: 4.3 MMOL/L (ref 3.5–5.1)
SODIUM SERPL-SCNC: 138 MMOL/L (ref 136–145)

## 2022-09-06 PROCEDURE — 80048 BASIC METABOLIC PNL TOTAL CA: CPT

## 2022-09-06 PROCEDURE — 36415 COLL VENOUS BLD VENIPUNCTURE: CPT

## 2022-09-08 ENCOUNTER — ANESTHESIA EVENT (OUTPATIENT)
Dept: INTERVENTIONAL RADIOLOGY/VASCULAR | Facility: HOSPITAL | Age: 52
End: 2022-09-08
Payer: COMMERCIAL

## 2022-09-09 ENCOUNTER — HOSPITAL ENCOUNTER (OUTPATIENT)
Dept: INTERVENTIONAL RADIOLOGY/VASCULAR | Facility: HOSPITAL | Age: 52
Discharge: HOME | End: 2022-09-09
Attending: INTERNAL MEDICINE | Admitting: INTERNAL MEDICINE
Payer: COMMERCIAL

## 2022-09-09 ENCOUNTER — ANESTHESIA (OUTPATIENT)
Dept: INTERVENTIONAL RADIOLOGY/VASCULAR | Facility: HOSPITAL | Age: 52
End: 2022-09-09
Payer: COMMERCIAL

## 2022-09-09 VITALS
TEMPERATURE: 98 F | DIASTOLIC BLOOD PRESSURE: 74 MMHG | RESPIRATION RATE: 18 BRPM | WEIGHT: 260 LBS | SYSTOLIC BLOOD PRESSURE: 135 MMHG | HEART RATE: 90 BPM | HEIGHT: 72 IN | OXYGEN SATURATION: 96 % | BODY MASS INDEX: 35.21 KG/M2

## 2022-09-09 DIAGNOSIS — I47.1 SVT (SUPRAVENTRICULAR TACHYCARDIA) (HCC): ICD-10-CM

## 2022-09-09 LAB
ATRIAL RATE: 84 BPM
BASE EXCESS BLD CALC-SCNC: 3 MMOL/L
CA-I BLD-SCNC: 1.27 MMOL/L (ref 1.12–1.32)
CO2 BLD-SCNC: 30 MMOL/L (ref 22–32)
GLUCOSE BLD-MCNC: 144 MG/DL (ref 70–99)
HCO3 BLD-SCNC: 28.4 MEQ/L
HCT VFR BLD CALC: 42 %
ISTAT ACTIVATED CLOTTING TIME: 115 SECONDS (ref 74–137)
ISTAT ACTIVATED CLOTTING TIME: 121 SECONDS (ref 74–137)
ISTAT ACTIVATED CLOTTING TIME: 225 SECONDS (ref 74–137)
ISTAT ACTIVATED CLOTTING TIME: 260 SECONDS (ref 74–137)
ISTAT ACTIVATED CLOTTING TIME: 271 SECONDS (ref 74–137)
P AXIS: 57 DEGREES
P-R INTERVAL: 144 MS
PCO2 BLD: 53.7 MMHG
PH BLD: 7.33 [PH]
PO2 BLD: 209 MMHG
POTASSIUM BLD-SCNC: 4.4 MMOL/L (ref 3.6–5.1)
Q-T INTERVAL: 380 MS
QRS DURATION: 106 MS
QTC CALCULATION (BEZET): 449 MS
R AXIS: 41 DEGREES
SAO2 % BLD: 100 %
SODIUM BLD-SCNC: 138 MMOL/L (ref 136–145)
T AXIS: 15 DEGREES
VENTRICULAR RATE: 84 BPM

## 2022-09-09 PROCEDURE — 84295 ASSAY OF SERUM SODIUM: CPT

## 2022-09-09 PROCEDURE — 4A0234Z MEASUREMENT OF CARDIAC ELECTRICAL ACTIVITY, PERCUTANEOUS APPROACH: ICD-10-PCS | Performed by: INTERNAL MEDICINE

## 2022-09-09 PROCEDURE — 02583ZZ DESTRUCTION OF CONDUCTION MECHANISM, PERCUTANEOUS APPROACH: ICD-10-PCS | Performed by: INTERNAL MEDICINE

## 2022-09-09 PROCEDURE — 84132 ASSAY OF SERUM POTASSIUM: CPT

## 2022-09-09 PROCEDURE — 85014 HEMATOCRIT: CPT

## 2022-09-09 PROCEDURE — B246ZZZ ULTRASONOGRAPHY OF RIGHT AND LEFT HEART: ICD-10-PCS | Performed by: INTERNAL MEDICINE

## 2022-09-09 PROCEDURE — 82330 ASSAY OF CALCIUM: CPT

## 2022-09-09 PROCEDURE — 93662 INTRACARDIAC ECG (ICE): CPT | Performed by: INTERNAL MEDICINE

## 2022-09-09 PROCEDURE — 93010 ELECTROCARDIOGRAM REPORT: CPT | Performed by: INTERNAL MEDICINE

## 2022-09-09 PROCEDURE — 4A023FZ MEASUREMENT OF CARDIAC RHYTHM, PERCUTANEOUS APPROACH: ICD-10-PCS | Performed by: INTERNAL MEDICINE

## 2022-09-09 PROCEDURE — 02K83ZZ MAP CONDUCTION MECHANISM, PERCUTANEOUS APPROACH: ICD-10-PCS | Performed by: INTERNAL MEDICINE

## 2022-09-09 PROCEDURE — 93653 COMPRE EP EVAL TX SVT: CPT | Performed by: INTERNAL MEDICINE

## 2022-09-09 PROCEDURE — 93005 ELECTROCARDIOGRAM TRACING: CPT

## 2022-09-09 PROCEDURE — B24CZZZ ULTRASONOGRAPHY OF PERICARDIUM: ICD-10-PCS | Performed by: INTERNAL MEDICINE

## 2022-09-09 PROCEDURE — 93462 L HRT CATH TRNSPTL PUNCTURE: CPT | Performed by: INTERNAL MEDICINE

## 2022-09-09 PROCEDURE — 82803 BLOOD GASES ANY COMBINATION: CPT

## 2022-09-09 PROCEDURE — 85347 COAGULATION TIME ACTIVATED: CPT

## 2022-09-09 RX ORDER — METOCLOPRAMIDE HYDROCHLORIDE 5 MG/ML
INJECTION INTRAMUSCULAR; INTRAVENOUS AS NEEDED
Status: DISCONTINUED | OUTPATIENT
Start: 2022-09-09 | End: 2022-09-09 | Stop reason: SURG

## 2022-09-09 RX ORDER — HYDROMORPHONE HYDROCHLORIDE 1 MG/ML
0.6 INJECTION, SOLUTION INTRAMUSCULAR; INTRAVENOUS; SUBCUTANEOUS EVERY 5 MIN PRN
Status: DISCONTINUED | OUTPATIENT
Start: 2022-09-09 | End: 2022-09-09 | Stop reason: HOSPADM

## 2022-09-09 RX ORDER — NALOXONE HYDROCHLORIDE 0.4 MG/ML
80 INJECTION, SOLUTION INTRAMUSCULAR; INTRAVENOUS; SUBCUTANEOUS AS NEEDED
Status: DISCONTINUED | OUTPATIENT
Start: 2022-09-09 | End: 2022-09-09 | Stop reason: HOSPADM

## 2022-09-09 RX ORDER — NEOSTIGMINE METHYLSULFATE 1 MG/ML
INJECTION, SOLUTION INTRAVENOUS AS NEEDED
Status: DISCONTINUED | OUTPATIENT
Start: 2022-09-09 | End: 2022-09-09 | Stop reason: SURG

## 2022-09-09 RX ORDER — SODIUM CHLORIDE 9 MG/ML
INJECTION, SOLUTION INTRAVENOUS
Status: COMPLETED | OUTPATIENT
Start: 2022-09-10 | End: 2022-09-09

## 2022-09-09 RX ORDER — CHLORHEXIDINE GLUCONATE 4 G/100ML
30 SOLUTION TOPICAL
Status: DISCONTINUED | OUTPATIENT
Start: 2022-09-10 | End: 2022-09-09 | Stop reason: HOSPADM

## 2022-09-09 RX ORDER — SODIUM CHLORIDE, SODIUM LACTATE, POTASSIUM CHLORIDE, CALCIUM CHLORIDE 600; 310; 30; 20 MG/100ML; MG/100ML; MG/100ML; MG/100ML
INJECTION, SOLUTION INTRAVENOUS CONTINUOUS
Status: DISCONTINUED | OUTPATIENT
Start: 2022-09-09 | End: 2022-09-09 | Stop reason: HOSPADM

## 2022-09-09 RX ORDER — ROCURONIUM BROMIDE 10 MG/ML
INJECTION, SOLUTION INTRAVENOUS AS NEEDED
Status: DISCONTINUED | OUTPATIENT
Start: 2022-09-09 | End: 2022-09-09 | Stop reason: SURG

## 2022-09-09 RX ORDER — HEPARIN SODIUM 5000 [USP'U]/ML
INJECTION, SOLUTION INTRAVENOUS; SUBCUTANEOUS
Status: DISCONTINUED
Start: 2022-09-09 | End: 2022-09-09 | Stop reason: WASHOUT

## 2022-09-09 RX ORDER — SODIUM CHLORIDE 9 MG/ML
INJECTION, SOLUTION INTRAVENOUS CONTINUOUS
OUTPATIENT
Start: 2022-09-09 | End: 2022-09-09

## 2022-09-09 RX ORDER — SODIUM CHLORIDE, SODIUM LACTATE, POTASSIUM CHLORIDE, CALCIUM CHLORIDE 600; 310; 30; 20 MG/100ML; MG/100ML; MG/100ML; MG/100ML
INJECTION, SOLUTION INTRAVENOUS CONTINUOUS PRN
Status: DISCONTINUED | OUTPATIENT
Start: 2022-09-09 | End: 2022-09-09 | Stop reason: SURG

## 2022-09-09 RX ORDER — DEXTROSE MONOHYDRATE 25 G/50ML
50 INJECTION, SOLUTION INTRAVENOUS
Status: DISCONTINUED | OUTPATIENT
Start: 2022-09-09 | End: 2022-09-09 | Stop reason: HOSPADM

## 2022-09-09 RX ORDER — METOPROLOL SUCCINATE 25 MG/1
25 TABLET, EXTENDED RELEASE ORAL NIGHTLY
Qty: 30 TABLET | Refills: 0 | Status: SHIPPED | OUTPATIENT
Start: 2022-09-09

## 2022-09-09 RX ORDER — MIDAZOLAM HYDROCHLORIDE 1 MG/ML
INJECTION INTRAMUSCULAR; INTRAVENOUS AS NEEDED
Status: DISCONTINUED | OUTPATIENT
Start: 2022-09-09 | End: 2022-09-09 | Stop reason: SURG

## 2022-09-09 RX ORDER — ASPIRIN 81 MG/1
81 TABLET, CHEWABLE ORAL DAILY
Qty: 30 TABLET | Refills: 0 | Status: SHIPPED | OUTPATIENT
Start: 2022-09-09

## 2022-09-09 RX ORDER — GLYCOPYRROLATE 0.2 MG/ML
INJECTION, SOLUTION INTRAMUSCULAR; INTRAVENOUS AS NEEDED
Status: DISCONTINUED | OUTPATIENT
Start: 2022-09-09 | End: 2022-09-09 | Stop reason: SURG

## 2022-09-09 RX ORDER — NICOTINE POLACRILEX 4 MG
15 LOZENGE BUCCAL
Status: DISCONTINUED | OUTPATIENT
Start: 2022-09-09 | End: 2022-09-09 | Stop reason: HOSPADM

## 2022-09-09 RX ORDER — ONDANSETRON 2 MG/ML
INJECTION INTRAMUSCULAR; INTRAVENOUS AS NEEDED
Status: DISCONTINUED | OUTPATIENT
Start: 2022-09-09 | End: 2022-09-09 | Stop reason: SURG

## 2022-09-09 RX ORDER — HYDROCODONE BITARTRATE AND ACETAMINOPHEN 5; 325 MG/1; MG/1
TABLET ORAL
Status: COMPLETED
Start: 2022-09-09 | End: 2022-09-09

## 2022-09-09 RX ORDER — HYDROMORPHONE HYDROCHLORIDE 1 MG/ML
0.4 INJECTION, SOLUTION INTRAMUSCULAR; INTRAVENOUS; SUBCUTANEOUS EVERY 5 MIN PRN
Status: DISCONTINUED | OUTPATIENT
Start: 2022-09-09 | End: 2022-09-09 | Stop reason: HOSPADM

## 2022-09-09 RX ORDER — DEXAMETHASONE SODIUM PHOSPHATE 4 MG/ML
VIAL (ML) INJECTION AS NEEDED
Status: DISCONTINUED | OUTPATIENT
Start: 2022-09-09 | End: 2022-09-09 | Stop reason: SURG

## 2022-09-09 RX ORDER — METOPROLOL TARTRATE 5 MG/5ML
2.5 INJECTION INTRAVENOUS ONCE
Status: DISCONTINUED | OUTPATIENT
Start: 2022-09-09 | End: 2022-09-09 | Stop reason: HOSPADM

## 2022-09-09 RX ORDER — LIDOCAINE HYDROCHLORIDE 10 MG/ML
INJECTION, SOLUTION EPIDURAL; INFILTRATION; INTRACAUDAL; PERINEURAL
Status: COMPLETED
Start: 2022-09-09 | End: 2022-09-09

## 2022-09-09 RX ORDER — HEPARIN SODIUM 1000 [USP'U]/ML
INJECTION, SOLUTION INTRAVENOUS; SUBCUTANEOUS
Status: COMPLETED
Start: 2022-09-09 | End: 2022-09-09

## 2022-09-09 RX ORDER — PROTAMINE SULFATE 10 MG/ML
INJECTION, SOLUTION INTRAVENOUS AS NEEDED
Status: DISCONTINUED | OUTPATIENT
Start: 2022-09-09 | End: 2022-09-09 | Stop reason: SURG

## 2022-09-09 RX ORDER — NICOTINE POLACRILEX 4 MG
30 LOZENGE BUCCAL
Status: DISCONTINUED | OUTPATIENT
Start: 2022-09-09 | End: 2022-09-09 | Stop reason: HOSPADM

## 2022-09-09 RX ORDER — HYDROMORPHONE HYDROCHLORIDE 1 MG/ML
INJECTION, SOLUTION INTRAMUSCULAR; INTRAVENOUS; SUBCUTANEOUS
Status: COMPLETED
Start: 2022-09-09 | End: 2022-09-09

## 2022-09-09 RX ORDER — HEPARIN SODIUM 5000 [USP'U]/ML
INJECTION, SOLUTION INTRAVENOUS; SUBCUTANEOUS AS NEEDED
Status: DISCONTINUED | OUTPATIENT
Start: 2022-09-09 | End: 2022-09-09 | Stop reason: SURG

## 2022-09-09 RX ORDER — HYDROMORPHONE HYDROCHLORIDE 1 MG/ML
0.2 INJECTION, SOLUTION INTRAMUSCULAR; INTRAVENOUS; SUBCUTANEOUS EVERY 5 MIN PRN
Status: DISCONTINUED | OUTPATIENT
Start: 2022-09-09 | End: 2022-09-09 | Stop reason: HOSPADM

## 2022-09-09 RX ADMIN — PROTAMINE SULFATE 40 MG: 10 INJECTION, SOLUTION INTRAVENOUS at 12:13:00

## 2022-09-09 RX ADMIN — ONDANSETRON 4 MG: 2 INJECTION INTRAMUSCULAR; INTRAVENOUS at 09:32:00

## 2022-09-09 RX ADMIN — ROCURONIUM BROMIDE 20 MG: 10 INJECTION, SOLUTION INTRAVENOUS at 10:37:00

## 2022-09-09 RX ADMIN — DEXAMETHASONE SODIUM PHOSPHATE 4 MG: 4 MG/ML VIAL (ML) INJECTION at 09:32:00

## 2022-09-09 RX ADMIN — SODIUM CHLORIDE: 9 INJECTION, SOLUTION INTRAVENOUS at 09:46:00

## 2022-09-09 RX ADMIN — SODIUM CHLORIDE, SODIUM LACTATE, POTASSIUM CHLORIDE, CALCIUM CHLORIDE: 600; 310; 30; 20 INJECTION, SOLUTION INTRAVENOUS at 09:26:00

## 2022-09-09 RX ADMIN — ROCURONIUM BROMIDE 20 MG: 10 INJECTION, SOLUTION INTRAVENOUS at 11:33:00

## 2022-09-09 RX ADMIN — HEPARIN SODIUM 5000 UNITS: 5000 INJECTION, SOLUTION INTRAVENOUS; SUBCUTANEOUS at 10:59:00

## 2022-09-09 RX ADMIN — NEOSTIGMINE METHYLSULFATE 5 MG: 1 INJECTION, SOLUTION INTRAVENOUS at 12:33:00

## 2022-09-09 RX ADMIN — ROCURONIUM BROMIDE 5 MG: 10 INJECTION, SOLUTION INTRAVENOUS at 09:32:00

## 2022-09-09 RX ADMIN — GLYCOPYRROLATE 1 MG: 0.2 INJECTION, SOLUTION INTRAMUSCULAR; INTRAVENOUS at 12:33:00

## 2022-09-09 RX ADMIN — HYDROMORPHONE HYDROCHLORIDE 0.4 MG: 1 INJECTION, SOLUTION INTRAMUSCULAR; INTRAVENOUS; SUBCUTANEOUS at 13:25:00

## 2022-09-09 RX ADMIN — ROCURONIUM BROMIDE 50 MG: 10 INJECTION, SOLUTION INTRAVENOUS at 09:40:00

## 2022-09-09 RX ADMIN — HEPARIN SODIUM 10000 UNITS: 5000 INJECTION, SOLUTION INTRAVENOUS; SUBCUTANEOUS at 10:14:00

## 2022-09-09 RX ADMIN — PROTAMINE SULFATE 10 MG: 10 INJECTION, SOLUTION INTRAVENOUS at 12:11:00

## 2022-09-09 RX ADMIN — HYDROCODONE BITARTRATE AND ACETAMINOPHEN: 5; 325 TABLET ORAL at 14:00:00

## 2022-09-09 RX ADMIN — ROCURONIUM BROMIDE 20 MG: 10 INJECTION, SOLUTION INTRAVENOUS at 11:08:00

## 2022-09-09 RX ADMIN — METOCLOPRAMIDE HYDROCHLORIDE 10 MG: 5 INJECTION INTRAMUSCULAR; INTRAVENOUS at 09:32:00

## 2022-09-09 RX ADMIN — ROCURONIUM BROMIDE 20 MG: 10 INJECTION, SOLUTION INTRAVENOUS at 10:16:00

## 2022-09-09 RX ADMIN — MIDAZOLAM HYDROCHLORIDE 2 MG: 1 INJECTION INTRAMUSCULAR; INTRAVENOUS at 09:30:00

## 2022-09-09 RX ADMIN — HEPARIN SODIUM 5000 UNITS: 5000 INJECTION, SOLUTION INTRAVENOUS; SUBCUTANEOUS at 10:27:00

## 2022-09-09 NOTE — PROGRESS NOTES
Patient arrived from PACU post RFA . Bilat groins remain soft, dry and intact. Pedal pulse 3+ bilat. Wife at bedside. Discussed plan of care with patient and wife. VSS. Resting comfortable.

## 2022-09-09 NOTE — ANESTHESIA PROCEDURE NOTES
Peripheral IV  Date/Time: 9/9/2022 9:45 AM  Inserted by: Melissa Urbina MD    Placement  Needle size: 18 G  Laterality: left  Location: antecubital  Local anesthetic: none  Site prep: alcohol  Technique: anatomical landmarks  Attempts: 1

## 2022-09-09 NOTE — ANESTHESIA PROCEDURE NOTES
Arterial Line  Performed by: Chastity Mancuso MD  Authorized by: Chastity Mancuso MD     General Information and Staff    Procedure Start:  9/9/2022 9:41 AM  Procedure End:  9/9/2022 9:43 AM  Anesthesiologist:  Chastity Mancuso MD  Performed By:  Anesthesiologist  Patient Location:  OR  Indication: continuous blood pressure monitoring and blood sampling needed    Site Identification: surface landmarks    Preanesthetic Checklist: 2 patient identifiers, IV checked, risks and benefits discussed, monitors and equipment checked, pre-op evaluation, timeout performed, anesthesia consent and sterile technique used    Procedure Details    Catheter Size:  20 GCatheter Type:  Arrow  Seldinger Technique?: Yes    Laterality:  LeftSite:  Radial artery  Site Prep: chlorhexidine  Line Secured:  Tegaderm and tape    Assessment    Events: patient tolerated procedure well with no complications      Medications      Additional Comments

## 2022-09-09 NOTE — ANESTHESIA POSTPROCEDURE EVALUATION
[facility] Patient Status:  Outpatient in a Bed   Age/Gender 46year old male MRN [id number]   [facility] Att[facility] Selam Phoenix MD   Hosp Day # 0 PCP Deion Skinner MD       Anesthesia Post-op Note        Procedure Summary     Date: 09/09/22 Room / Location: [facility] Interventional Suites    Anesthesia Start: 5257 Anesthesia Stop: 7177    Procedure: CATH EP Diagnosis:       SVT (supraventricular tachycardia) ([facility])      SVT (supraventricular tachycardia) ([facility] [address]    Scheduled Providers: Linda Quinn MD Anesthesiologist: Linda Quinn MD    Anesthesia Type: general ASA Status: 3          Anesthesia Type: general    Vitals Value Taken Time   /82 09/09/22 1306   Temp 98.2 09/09/22 1306   Pulse 92 09/09/22 1306   Resp 18 09/09/22 1306   SpO2 94 09/09/22 1306       Patient Location: PACU    Anesthesia Type: general    Airway Patency: patent and extubated    Postop Pain Control: adequate    Mental Status: mildly sedated but able to meaningfully participate in the post-anesthesia evaluation    Nausea/Vomiting: none    Cardiopulmonary/Hydration status: stable euvolemic    Complications: no apparent anesthesia related complications    Dental Exam: Unchanged from Preop    Patient to be discharged from PACU when criteria met.

## 2022-09-09 NOTE — PROCEDURES
Procedures performed:  1. Comprehensive EP study  2. CS catheter placement to record and pace the left atrium. 3. Intra-cardiac echo (ICE)  4. RFA of SVT utilizing left lateral concealed AP. : Josee Osborne MD    Indication: PSVT    Complication: none    Methods: The patient was brought to the EP lab in a fasting state and non-sedated state. The right and left groins were prepped and draped in a sterile fashion. 3 sheaths were placed in the right femoral john via the modified Seldinger technique. In a similar manner, 1 sheaths was placed in the left femoral vein and left femoral artery. Catheters were placed under 3D mapping and ICE guidance. Baseline measurements were recorded and programmed stimulation from the atrium and ventricle was performed. At the conclusion of the procedure, catheters and sheaths were removed and hemostasis was achieved with Vascade closure devices. There were no apparent intra-operative complications. The patient was transported in stable condition to recovery. EP study findings:   ms,  ms, QRS 98 ms,  ms. AH 72 ms, HV 48 ms. AV node BCL unable to obtain due to recurrent SVT. VA conduction was present, the VA BCL was 260 ms. Retrograde conduction was eccentric pattern, earliest activation in the distal CS. Arrhythmia observations: SVT  ms, V-HRA time 162ms, V-prox CS time 120, V-distal CS time 105. During AVRT, the patient developed LBBB, the earliest [place] time in the distal CS  increased to 150 ms with the same activation sequence. 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 Mapping and ablation: A heparin bolus was given to achieve a goal ACT of 300 seconds. Using intracardiac echo, transseptal catheterization was performed. A multielectrode mapping catheter was used to record earliest atrial activation during ventricular pacing and eccentric retrograde conduction.   This is localized earliest activation to the lateral mitral annulus. SVT was induced, this also confirmed earliest site of retrograde atrial activation to be in the lateral mitral annulus. Using an open irrigation ablation catheter, radiofrequency ablation was delivered on the mitral valve annulus at the site of earliest retrograde activation (fused atrial-ventricular signal) this resulted in termination of SVT. Post ablation findings:    ms,  ms, QRS 98 ms,  ms. AH 68 ms, HV 45 ms. AV node  ms     AV node /230 ms    VA conduction was present, the VA BCL was 540. Retrograde conduction was midline and decremental.    SVT was not inducible post ablation. There was no pericardial effusion at the end of the procedure by intra-cardiac echo. CONCLUSIONS:  1. Sinus node function normal.  2. AV node function normal.  3. His [place] normal.  4. 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 5. Status post successful ablation of a concealed left posterior AP. 6. There was no pericardial effusion at the end of the procedure by intra-cardiac echo. 7. The patient will be instructed to take 81 mg aspirin for one month upon discharge.        Annie Cobb MD.

## 2022-09-09 NOTE — ANESTHESIA PROCEDURE NOTES
Airway  Date/Time: 9/9/2022 9:40 AM  Urgency: elective    Airway not difficult    General Information and Staff    Patient location during procedure: cath lab  Anesthesiologist: Mil Suarez MD  Performed: anesthesiologist     Indications and Patient Condition  Indications for airway management: anesthesia  Sedation level: deep  Preoxygenated: yes  Patient position: sniffing  Mask difficulty assessment: 2 - vent by mask + OA or adjuvant +/- NMBA    Final Airway Details  Final airway type: endotracheal airway      Successful airway: ETT  Cuffed: yes   Successful intubation technique: Video laryngoscopy  Endotracheal tube insertion site: oral  Blade: GlideScope  Blade size: #3  ETT size (mm): 7.5    Cormack-Lehane Classification: grade IIA - partial view of glottis  Placement verified by: chest auscultation and capnometry   Measured from: lips  ETT to lips (cm): 23  Number of attempts at approach: 1  Ventilation between attempts: none  Number of other approaches attempted: 0    Additional Comments  PreO2. Glidescope #3 utilized, grade 2 view, omega-shaped epiglottis noted. Atraumatic oral intubation of ETT 7.5, +ETCO2, +BBS. Taped at 23 cm. Oral airway 100mm in.

## 2022-09-09 NOTE — IVS NOTE
Patient discharged home post ablation. Right and left groin site clean, dry and intact after ambulating in hallways. Patient able to tolerate food/drink. Dr. Topher Jeffries spoke to patient's wife post procedure. VSS. AVS review. Patient instructed to decrease metoprolol to 25 mg daily and take 81 mg of aspirin daily for 1 month as instructed by Dr. Topher Jeffries. All questions answered. PIVs removed. Patient discharged in wheelchair with all belongings.

## 2022-10-14 RX ORDER — ATORVASTATIN CALCIUM 20 MG/1
TABLET, FILM COATED ORAL
Qty: 90 TABLET | Refills: 0 | Status: SHIPPED | OUTPATIENT
Start: 2022-10-14

## 2022-10-16 RX ORDER — TADALAFIL 20 MG/1
TABLET ORAL
Qty: 24 TABLET | Refills: 0 | Status: SHIPPED | OUTPATIENT
Start: 2022-10-16

## 2022-12-27 RX ORDER — TADALAFIL 20 MG/1
TABLET ORAL
Qty: 18 TABLET | Refills: 0 | Status: SHIPPED | OUTPATIENT
Start: 2022-12-27

## 2022-12-27 RX ORDER — ATORVASTATIN CALCIUM 20 MG/1
TABLET, FILM COATED ORAL
Qty: 90 TABLET | Refills: 0 | Status: SHIPPED | OUTPATIENT
Start: 2022-12-27

## 2023-04-12 NOTE — TELEPHONE ENCOUNTER
Cholesterol Medication Protocol Failed 04/12/2023 12:18 AM   Protocol Details  Lipid panel within past 12 months    Appointment within past 12 or next 3 months    ALT < 80    ALT resulted within past year     Due for Px and labs  [facility] sent  No future appointments.

## 2023-04-16 RX ORDER — ATORVASTATIN CALCIUM 20 MG/1
TABLET, FILM COATED ORAL
Qty: 90 TABLET | Refills: 0 | Status: SHIPPED | OUTPATIENT
Start: 2023-04-16

## 2023-06-01 ENCOUNTER — PATIENT OUTREACH (OUTPATIENT)
Dept: FAMILY MEDICINE CLINIC | Facility: CLINIC | Age: 53
End: 2023-06-01

## 2023-06-01 NOTE — PROGRESS NOTES
[facility]  pt needs to schedule Px with dr Ashley Reeves   Pt's last [facility] 2/9/2022

## 2023-06-07 VITALS — SYSTOLIC BLOOD PRESSURE: 127 MMHG | DIASTOLIC BLOOD PRESSURE: 72 MMHG

## 2023-07-25 ENCOUNTER — TELEPHONE (OUTPATIENT)
Dept: FAMILY MEDICINE CLINIC | Facility: CLINIC | Age: 53
End: 2023-07-25

## 2023-07-25 NOTE — TELEPHONE ENCOUNTER
Left detailed message. Ok per [employer] form. Advised to call with questions. Due for Px and labs  No future appointments.

## 2023-09-25 NOTE — TELEPHONE ENCOUNTER
TADALAFIL 20 MG Oral Tab       valsartan 80 MG Oral Tab pt not sure of dose said he will call back.      Per pt he needs a 90 day     [facility] [address], [address] [facility] [address], [phone], [phone] [[id number]]     Future Appointments   Date Time Provider Ghazal Pettit   11/15/2023  8:00 AM Estefania Yepez MD [facility]

## 2023-09-25 NOTE — TELEPHONE ENCOUNTER
Last OV 2/9/22  Last refilled on 12/27/22 # 18 with 0 refills  Valsartan hydrochlorothiazide 8/17/21  #90 0 refill  Future Appointments   Date Time Provider Ghazal Pettit   11/15/2023  8:00 AM Pavan Franco MD [facility]        Thank you.

## 2023-09-25 NOTE — TELEPHONE ENCOUNTER
Valsartan-hydroCHLOROthiazide 80-12.5 MG Oral Ta   Pt called again to verify the dosage of RX,   Pt states he had a Heart Ablation 9/2022 and he had a lot of the medication left until now.       Pt scheduled a px on 11/15/2023   Future Appointments   Date Time Provider Ghazal Pettit   11/15/2023  8:00 AM [facility]

## 2023-09-26 RX ORDER — VALSARTAN AND HYDROCHLOROTHIAZIDE 80; 12.5 MG/1; MG/1
1 TABLET, FILM COATED ORAL DAILY
Qty: 90 TABLET | Refills: 0 | OUTPATIENT
Start: 2023-09-26

## 2023-09-26 RX ORDER — TADALAFIL 20 MG/1
20 TABLET ORAL AS NEEDED
Qty: 18 TABLET | Refills: 0 | OUTPATIENT
Start: 2023-09-26

## 2023-09-29 ENCOUNTER — PATIENT MESSAGE (OUTPATIENT)
Dept: FAMILY MEDICINE CLINIC | Facility: CLINIC | Age: 53
End: 2023-09-29

## 2023-09-29 RX ORDER — VALSARTAN AND HYDROCHLOROTHIAZIDE 80; 12.5 MG/1; MG/1
1 TABLET, FILM COATED ORAL DAILY
Qty: 90 TABLET | Refills: 0 | Status: SHIPPED | OUTPATIENT
Start: 2023-09-29

## 2023-09-29 RX ORDER — TADALAFIL 20 MG/1
20 TABLET ORAL AS NEEDED
Qty: 18 TABLET | Refills: 0 | Status: SHIPPED | OUTPATIENT
Start: 2023-09-29

## 2023-09-29 NOTE — TELEPHONE ENCOUNTER
Meds pended    Future Appointments   Date Time Provider Ghazal Hodan   11/15/2023  8:00 AM Tiffanie Hernandez MD [facility]

## 2023-09-29 NOTE — TELEPHONE ENCOUNTER
From: Nidhi Lazo  To: Rosalia Leonel  Sent: 9/29/2023 2:57 PM CDT  Subject: Arlene Lara,    I have a scheduled appointment on November 15th(soonest I could get in) but wanted to check if you could refill my Valsartan and Tafadil before that appointment. I am out of both of them but still have enough Atorvanstatin to get me through that appointment. I talked to Beatris on Monday regarding this but have not heard back so I thought I would drop a reminder note instead of just requesting an online refill. Thanks in advance.     Kourtney Leyva

## 2023-11-15 ENCOUNTER — LAB ENCOUNTER (OUTPATIENT)
Dept: LAB | Age: 53
End: 2023-11-15
Attending: FAMILY MEDICINE
Payer: COMMERCIAL

## 2023-11-15 ENCOUNTER — OFFICE VISIT (OUTPATIENT)
Dept: FAMILY MEDICINE CLINIC | Facility: CLINIC | Age: 53
End: 2023-11-15
Payer: COMMERCIAL

## 2023-11-15 VITALS
HEIGHT: 72 IN | HEART RATE: 80 BPM | WEIGHT: 274 LBS | OXYGEN SATURATION: 96 % | DIASTOLIC BLOOD PRESSURE: 82 MMHG | RESPIRATION RATE: 16 BRPM | SYSTOLIC BLOOD PRESSURE: 128 MMHG | TEMPERATURE: 98 F | BODY MASS INDEX: 37.11 KG/M2

## 2023-11-15 DIAGNOSIS — Z00.00 ANNUAL PHYSICAL EXAM: ICD-10-CM

## 2023-11-15 DIAGNOSIS — E66.9 DIABETES MELLITUS TYPE 2 IN OBESE: ICD-10-CM

## 2023-11-15 DIAGNOSIS — Z12.11 SCREEN FOR COLON CANCER: ICD-10-CM

## 2023-11-15 DIAGNOSIS — I10 ESSENTIAL HYPERTENSION: ICD-10-CM

## 2023-11-15 DIAGNOSIS — E11.69 DIABETES MELLITUS TYPE 2 IN OBESE: ICD-10-CM

## 2023-11-15 DIAGNOSIS — G47.33 OSA ON CPAP: ICD-10-CM

## 2023-11-15 DIAGNOSIS — Z23 NEED FOR VACCINATION: ICD-10-CM

## 2023-11-15 DIAGNOSIS — Z00.00 ANNUAL PHYSICAL EXAM: Primary | ICD-10-CM

## 2023-11-15 DIAGNOSIS — E55.9 VITAMIN D DEFICIENCY: ICD-10-CM

## 2023-11-15 DIAGNOSIS — E78.00 HYPERCHOLESTEROLEMIA: ICD-10-CM

## 2023-11-15 DIAGNOSIS — N52.9 ERECTILE DYSFUNCTION, UNSPECIFIED ERECTILE DYSFUNCTION TYPE: ICD-10-CM

## 2023-11-15 LAB
ALBUMIN SERPL-MCNC: 4.2 G/DL (ref 3.4–5)
ALBUMIN/GLOB SERPL: 1.2 {RATIO} (ref 1–2)
ALP LIVER SERPL-CCNC: 79 U/L
ALT SERPL-CCNC: 120 U/L
ANION GAP SERPL CALC-SCNC: 5 MMOL/L (ref 0–18)
AST SERPL-CCNC: 57 U/L (ref 15–37)
BASOPHILS # BLD AUTO: 0.04 X10(3) UL (ref 0–0.2)
BASOPHILS NFR BLD AUTO: 0.7 %
BILIRUB SERPL-MCNC: 1.2 MG/DL (ref 0.1–2)
BUN BLD-MCNC: 22 MG/DL (ref 9–23)
CALCIUM BLD-MCNC: 9.9 MG/DL (ref 8.5–10.1)
CHLORIDE SERPL-SCNC: 100 MMOL/L (ref 98–112)
CHOLEST SERPL-MCNC: 189 MG/DL (ref ?–200)
CO2 SERPL-SCNC: 29 MMOL/L (ref 21–32)
COMPLEXED PSA SERPL-MCNC: 2.96 NG/ML (ref ?–4)
CREAT BLD-MCNC: 1.2 MG/DL
CREAT UR-SCNC: 44.5 MG/DL
EGFRCR SERPLBLD CKD-EPI 2021: 72 ML/MIN/1.73M2 (ref 60–?)
EOSINOPHIL # BLD AUTO: 0.16 X10(3) UL (ref 0–0.7)
EOSINOPHIL NFR BLD AUTO: 2.7 %
ERYTHROCYTE [DISTWIDTH] IN BLOOD BY AUTOMATED COUNT: 12.8 %
EST. AVERAGE GLUCOSE BLD GHB EST-MCNC: 177 MG/DL (ref 68–126)
FASTING PATIENT LIPID ANSWER: YES
FASTING STATUS PATIENT QL REPORTED: YES
GLOBULIN PLAS-MCNC: 3.6 G/DL (ref 2.8–4.4)
GLUCOSE BLD-MCNC: 166 MG/DL (ref 70–99)
HBA1C MFR BLD: 7.8 % (ref ?–5.7)
HCT VFR BLD AUTO: 49.7 %
HDLC SERPL-MCNC: 60 MG/DL (ref 40–59)
HGB BLD-MCNC: 16.4 G/DL
IMM GRANULOCYTES # BLD AUTO: 0.02 X10(3) UL (ref 0–1)
IMM GRANULOCYTES NFR BLD: 0.3 %
LDLC SERPL CALC-MCNC: 111 MG/DL (ref ?–100)
LYMPHOCYTES # BLD AUTO: 1.49 X10(3) UL (ref 1–4)
LYMPHOCYTES NFR BLD AUTO: 24.8 %
MCH RBC QN AUTO: 29.8 PG (ref 26–34)
MCHC RBC AUTO-ENTMCNC: 33 G/DL (ref 31–37)
MCV RBC AUTO: 90.2 FL
MICROALBUMIN UR-MCNC: 0.64 MG/DL
MICROALBUMIN/CREAT 24H UR-RTO: 14.4 UG/MG (ref ?–30)
MONOCYTES # BLD AUTO: 0.62 X10(3) UL (ref 0.1–1)
MONOCYTES NFR BLD AUTO: 10.3 %
NEUTROPHILS # BLD AUTO: 3.67 X10 (3) UL (ref 1.5–7.7)
NEUTROPHILS # BLD AUTO: 3.67 X10(3) UL (ref 1.5–7.7)
NEUTROPHILS NFR BLD AUTO: 61.2 %
NONHDLC SERPL-MCNC: 129 MG/DL (ref ?–130)
OSMOLALITY SERPL CALC.SUM OF ELEC: 285 MOSM/KG (ref 275–295)
PLATELET # BLD AUTO: 263 10(3)UL (ref 150–450)
POTASSIUM SERPL-SCNC: 4.1 MMOL/L (ref 3.5–5.1)
PROT SERPL-MCNC: 7.8 G/DL (ref 6.4–8.2)
RBC # BLD AUTO: 5.51 X10(6)UL
SODIUM SERPL-SCNC: 134 MMOL/L (ref 136–145)
TRIGL SERPL-MCNC: 98 MG/DL (ref 30–149)
TSI SER-ACNC: 1.25 MIU/ML (ref 0.36–3.74)
VIT D+METAB SERPL-MCNC: 16.5 NG/ML (ref 30–100)
VLDLC SERPL CALC-MCNC: 17 MG/DL (ref 0–30)
WBC # BLD AUTO: 6 X10(3) UL (ref 4–11)

## 2023-11-15 PROCEDURE — 99396 PREV VISIT EST AGE 40-64: CPT | Performed by: FAMILY MEDICINE

## 2023-11-15 PROCEDURE — 83036 HEMOGLOBIN GLYCOSYLATED A1C: CPT

## 2023-11-15 PROCEDURE — 82306 VITAMIN D 25 HYDROXY: CPT

## 2023-11-15 PROCEDURE — 3008F BODY MASS INDEX DOCD: CPT | Performed by: FAMILY MEDICINE

## 2023-11-15 PROCEDURE — 3074F SYST BP LT 130 MM HG: CPT | Performed by: FAMILY MEDICINE

## 2023-11-15 PROCEDURE — 82570 ASSAY OF URINE CREATININE: CPT

## 2023-11-15 PROCEDURE — 80053 COMPREHEN METABOLIC PANEL: CPT

## 2023-11-15 PROCEDURE — 85025 COMPLETE CBC W/AUTO DIFF WBC: CPT

## 2023-11-15 PROCEDURE — 82043 UR ALBUMIN QUANTITATIVE: CPT

## 2023-11-15 PROCEDURE — 84443 ASSAY THYROID STIM HORMONE: CPT

## 2023-11-15 PROCEDURE — 80061 LIPID PANEL: CPT

## 2023-11-15 PROCEDURE — 90750 HZV VACC RECOMBINANT IM: CPT | Performed by: FAMILY MEDICINE

## 2023-11-15 PROCEDURE — 90686 IIV4 VACC NO PRSV 0.5 ML IM: CPT | Performed by: FAMILY MEDICINE

## 2023-11-15 PROCEDURE — 90471 IMMUNIZATION ADMIN: CPT | Performed by: FAMILY MEDICINE

## 2023-11-15 PROCEDURE — 3079F DIAST BP 80-89 MM HG: CPT | Performed by: FAMILY MEDICINE

## 2023-11-15 PROCEDURE — 90472 IMMUNIZATION ADMIN EACH ADD: CPT | Performed by: FAMILY MEDICINE

## 2023-11-15 RX ORDER — METAXALONE 800 MG/1
800 TABLET ORAL NIGHTLY PRN
Qty: 30 TABLET | Refills: 0 | Status: SHIPPED | OUTPATIENT
Start: 2023-11-15 | End: 2023-12-15

## 2023-11-20 ENCOUNTER — TELEPHONE (OUTPATIENT)
Dept: FAMILY MEDICINE CLINIC | Facility: CLINIC | Age: 53
End: 2023-11-20

## 2023-11-20 RX ORDER — ERGOCALCIFEROL 1.25 MG/1
50000 CAPSULE ORAL WEEKLY
Qty: 12 CAPSULE | Refills: 0 | Status: SHIPPED | OUTPATIENT
Start: 2023-11-20 | End: 2024-02-06

## 2023-11-20 NOTE — TELEPHONE ENCOUNTER
----- Message from Henrik Hunter MD sent at 11/20/2023 10:04 AM CST -----  Vit D low. Start prescription vitamin D 50,000 units 1 tab p.o. weekly for 12 weeks. After prescription completed, start maintenance with over-the-counter vitamin D 2000 units daily  Send prescription to pharmacy and notify patient  Hemoglobin A1c increased to 7.8 and liver function test elevated . Need to discuss medication management.   Schedule video visit with me

## 2023-11-20 NOTE — TELEPHONE ENCOUNTER
Patient advised. Verbalized understanding.    Future Appointments   Date Time Provider Ghazal Pettit   12/6/2023  3:15 PM Bushra Arteaga MD [facility]

## 2023-11-21 RX ORDER — TADALAFIL 20 MG/1
20 TABLET ORAL AS NEEDED
Qty: 18 TABLET | Refills: 0 | Status: SHIPPED | OUTPATIENT
Start: 2023-11-21

## 2023-12-06 ENCOUNTER — TELEMEDICINE (OUTPATIENT)
Dept: FAMILY MEDICINE CLINIC | Facility: CLINIC | Age: 53
End: 2023-12-06
Payer: COMMERCIAL

## 2023-12-06 DIAGNOSIS — E66.9 DIABETES MELLITUS TYPE 2 IN OBESE  (HCC): Primary | ICD-10-CM

## 2023-12-06 DIAGNOSIS — E11.69 DIABETES MELLITUS TYPE 2 IN OBESE  (HCC): Primary | ICD-10-CM

## 2023-12-06 DIAGNOSIS — R79.89 ELEVATED LFTS: ICD-10-CM

## 2023-12-06 PROCEDURE — 99213 OFFICE O/P EST LOW 20 MIN: CPT | Performed by: FAMILY MEDICINE

## 2023-12-06 RX ORDER — METFORMIN HYDROCHLORIDE 500 MG/1
500 TABLET, EXTENDED RELEASE ORAL
Qty: 30 TABLET | Refills: 2 | Status: SHIPPED | OUTPATIENT
Start: 2023-12-06

## 2023-12-06 NOTE — PROGRESS NOTES
Chief Complaint   Patient presents with    Lab        HPI:    Patient ID: Dalton Amado is a 48year old male doing a video visit to discuss blood work. HbA1c 7.8  Diet: high in carbs  On statin  Exercise: walking  Open to starting metformin    Elevated LFTs  Was drinking a lot few days due to being on vacation  Not on any over-the-counter supplements. No excess Tylenol use    Review of Systems   Constitutional:  Negative for fatigue and unexpected weight change. Eyes:  Negative for visual disturbance. Gastrointestinal:  Negative for abdominal pain, nausea and vomiting. Endocrine: Negative for polydipsia and polyuria. Neurological:  Negative for dizziness and headaches. Current Outpatient Medications   Medication Sig Dispense Refill    metFORMIN  MG Oral Tablet 24 Hr Take 1 tablet (500 mg total) by mouth daily with breakfast. 30 tablet 2    Tadalafil 20 MG Oral Tab Take 1 tablet (20 mg total) by mouth as needed for Erectile Dysfunction. 18 tablet 0    ergocalciferol 1.25 MG (84898 UT) Oral Cap Take 1 capsule (50,000 Units total) by mouth once a week for 12 doses. 12 capsule 0    Metaxalone (SKELAXIN) 800 MG Oral Tab Take 1 tablet (800 mg total) by mouth nightly as needed for Pain. 30 tablet 0    valsartan-hydroCHLOROthiazide 80-12.5 MG Oral Tab Take 1 tablet by mouth daily. 90 tablet 0    ATORVASTATIN 20 MG Oral Tab TAKE 1 TABLET NIGHTLY 90 tablet 0    triamcinolone 55 MCG/ACT Nasal Aerosol by Nasal route as needed. Allergies:No Known Allergies    HISTORY:  Past Medical History:   Diagnosis Date    Back pain     Essential hypertension     Hives     Hypercholesterolemia     Obesity     Other and unspecified hyperlipidemia     Spinal stenosis       No past surgical history on file.    Family History   Problem Relation Age of Onset    Hypertension Father     Other (Hyerplipidemia [Other]) Father     Stroke Father     Other (Hyperlipidemia [Other]) Mother     Thyroid disease Mother Hypertension Paternal Grandfather     Other (Hyperlipidemia [Other]) Paternal Grandfather     Heart Disease Paternal Grandfather     Hypertension Paternal Grandmother     Stroke Paternal Grandmother     Other (Alcohol abuse [Other]) Maternal Grandfather     Dementia Maternal Grandmother       Social History:   Social History     Socioeconomic History    Marital status:    Tobacco Use    Smoking status: Never    Smokeless tobacco: Never    Tobacco comments:     non-smoker   Vaping Use    Vaping Use: Never used   Substance and Sexual Activity    Alcohol use: Yes     Alcohol/week: 8.0 standard drinks of alcohol     Types: 8 Standard drinks or equivalent per week     Comment: SOCIALLY    Drug use: No        PHYSICAL EXAM:    There were no vitals taken for this visit. Physical Exam  Constitutional:       General: He is not in acute distress. Eyes:      Conjunctiva/sclera: Conjunctivae normal.   Pulmonary:      Effort: Pulmonary effort is normal.   Psychiatric:         Mood and Affect: Mood normal.               ASSESSMENT/PLAN:     Encounter Diagnoses   Name Primary? Diabetes mellitus type 2 in obese  Yes    Elevated LFTs    Diabetes type 2: Needs improvement. Start metformin  Side effects and therapeutic benefits of medication reviewed. Patient expresses understanding. Advised on diet and exercise.   Schedule eye exam.  Repeat hemoglobin A1c in 3 months  Elevated liver function test: Repeat in few weeks      Orders Placed This Encounter   Procedures    Hemoglobin A1C [E]    Comp Metabolic Panel (14) [E]       Meds This Visit:  Requested Prescriptions     Signed Prescriptions Disp Refills    metFORMIN  MG Oral Tablet 24 Hr 30 tablet 2     Sig: Take 1 tablet (500 mg total) by mouth daily with breakfast.       Imaging & Referrals:  None

## 2023-12-18 RX ORDER — TADALAFIL 20 MG/1
20 TABLET ORAL AS NEEDED
Qty: 18 TABLET | Refills: 0 | Status: SHIPPED | OUTPATIENT
Start: 2023-12-18

## 2023-12-18 NOTE — TELEPHONE ENCOUNTER
LOV 12/6/23 for a lab f/u. Medication Quantity Refills Start End   Tadalafil 20 MG Oral Tab 18 tablet 0 11/21/2023 --   Sig:   Take 1 tablet (20 mg total) by mouth as needed for Erectile Dysfunction. Route:   Oral       No future appointments.

## 2024-01-26 ENCOUNTER — TELEPHONE (OUTPATIENT)
Dept: FAMILY MEDICINE CLINIC | Facility: CLINIC | Age: 54
End: 2024-01-26

## 2024-01-26 DIAGNOSIS — E11.69 DIABETES MELLITUS TYPE 2 IN OBESE  (HCC): Primary | ICD-10-CM

## 2024-01-26 DIAGNOSIS — E66.9 DIABETES MELLITUS TYPE 2 IN OBESE  (HCC): Primary | ICD-10-CM

## 2024-01-26 DIAGNOSIS — R79.89 ELEVATED LFTS: ICD-10-CM

## 2024-01-26 NOTE — TELEPHONE ENCOUNTER
Pt called and states he had a vv with Dr. Obrien on 12/06/23. Per pt, his liver numbers and A1C were high and he needed to get them retested. Pt wanting to know if he can just retest for his liver and wait on the A1C. Please advise

## 2024-01-26 NOTE — TELEPHONE ENCOUNTER
Due for A1c 2/15/24  Was supposed to repeat LFTS end of November    Patient advised. Verbalized understanding.   Will come to ref lab and let them know only CMP at this time.          Encounter Diagnoses   Name Primary?    Diabetes mellitus type 2 in obese  Yes    Elevated LFTs     Diabetes type 2: Needs improvement.  Start metformin  Side effects and therapeutic benefits of medication reviewed.  Patient expresses understanding.  Advised on diet and exercise.  Schedule eye exam.  Repeat hemoglobin A1c in 3 months  Elevated liver function test: Repeat in few weeks

## 2024-01-30 ENCOUNTER — LAB ENCOUNTER (OUTPATIENT)
Dept: LAB | Age: 54
End: 2024-01-30
Attending: FAMILY MEDICINE
Payer: COMMERCIAL

## 2024-01-30 DIAGNOSIS — R79.89 ELEVATED LFTS: ICD-10-CM

## 2024-01-30 LAB
ALBUMIN SERPL-MCNC: 4.1 G/DL (ref 3.4–5)
ALBUMIN/GLOB SERPL: 1.2 {RATIO} (ref 1–2)
ALP LIVER SERPL-CCNC: 71 U/L
ANION GAP SERPL CALC-SCNC: 7 MMOL/L (ref 0–18)
AST SERPL-CCNC: 24 U/L (ref 15–37)
BILIRUB SERPL-MCNC: 0.9 MG/DL (ref 0.1–2)
BUN BLD-MCNC: 20 MG/DL (ref 9–23)
CALCIUM BLD-MCNC: 9.4 MG/DL (ref 8.5–10.1)
CHLORIDE SERPL-SCNC: 105 MMOL/L (ref 98–112)
CO2 SERPL-SCNC: 26 MMOL/L (ref 21–32)
CREAT BLD-MCNC: 1.09 MG/DL
EGFRCR SERPLBLD CKD-EPI 2021: 81 ML/MIN/1.73M2 (ref 60–?)
FASTING STATUS PATIENT QL REPORTED: YES
GLOBULIN PLAS-MCNC: 3.3 G/DL (ref 2.8–4.4)
GLUCOSE BLD-MCNC: 153 MG/DL (ref 70–99)
OSMOLALITY SERPL CALC.SUM OF ELEC: 292 MOSM/KG (ref 275–295)
POTASSIUM SERPL-SCNC: 4.3 MMOL/L (ref 3.5–5.1)
PROT SERPL-MCNC: 7.4 G/DL (ref 6.4–8.2)
SODIUM SERPL-SCNC: 138 MMOL/L (ref 136–145)

## 2024-01-30 PROCEDURE — 80053 COMPREHEN METABOLIC PANEL: CPT

## 2024-01-31 ENCOUNTER — TELEPHONE (OUTPATIENT)
Dept: FAMILY MEDICINE CLINIC | Facility: CLINIC | Age: 54
End: 2024-01-31

## 2024-01-31 DIAGNOSIS — R79.89 ELEVATED LFTS: Primary | ICD-10-CM

## 2024-01-31 NOTE — TELEPHONE ENCOUNTER
----- Message from SILVIA Ramon sent at 1/31/2024  7:49 AM CST -----  AST has returned to normal  Lab unable to check ALT. [facility], apologize for the need for repeat lab draw

## 2024-01-31 NOTE — TELEPHONE ENCOUNTER
*Pt not contacted yet.    Please advise how this is being handled.  Is there a no-charge at [facility] for this?

## 2024-02-02 NOTE — TELEPHONE ENCOUNTER
Patient advised and verbalized understanding.  Patient will get lab test redrawn  Patient prefers to go to [facility]  Order placed.

## 2024-02-14 ENCOUNTER — TELEPHONE (OUTPATIENT)
Dept: FAMILY MEDICINE CLINIC | Facility: CLINIC | Age: 54
End: 2024-02-14

## 2024-02-21 ENCOUNTER — TELEPHONE (OUTPATIENT)
Dept: FAMILY MEDICINE CLINIC | Facility: CLINIC | Age: 54
End: 2024-02-21

## 2024-02-21 ENCOUNTER — LAB ENCOUNTER (OUTPATIENT)
Dept: LAB | Age: 54
End: 2024-02-21
Attending: NURSE PRACTITIONER
Payer: COMMERCIAL

## 2024-02-21 DIAGNOSIS — E11.69 DIABETES MELLITUS TYPE 2 IN OBESE (HCC): ICD-10-CM

## 2024-02-21 DIAGNOSIS — E66.9 DIABETES MELLITUS TYPE 2 IN OBESE (HCC): ICD-10-CM

## 2024-02-21 DIAGNOSIS — R79.89 ELEVATED LFTS: ICD-10-CM

## 2024-02-21 DIAGNOSIS — E78.00 HYPERCHOLESTEROLEMIA: ICD-10-CM

## 2024-02-21 DIAGNOSIS — R79.89 ELEVATED LFTS: Primary | ICD-10-CM

## 2024-02-21 LAB
ALBUMIN SERPL-MCNC: 4 G/DL (ref 3.4–5)
ALP LIVER SERPL-CCNC: 65 U/L
ALT SERPL-CCNC: 30 U/L
AST SERPL-CCNC: 17 U/L (ref 15–37)
BILIRUB DIRECT SERPL-MCNC: 0.2 MG/DL (ref 0–0.2)
BILIRUB SERPL-MCNC: 0.6 MG/DL (ref 0.1–2)
EST. AVERAGE GLUCOSE BLD GHB EST-MCNC: 151 MG/DL (ref 68–126)
HBA1C MFR BLD: 6.9 % (ref ?–5.7)
PROT SERPL-MCNC: 6.9 G/DL (ref 6.4–8.2)

## 2024-02-21 PROCEDURE — 80076 HEPATIC FUNCTION PANEL: CPT

## 2024-02-21 PROCEDURE — 83036 HEMOGLOBIN GLYCOSYLATED A1C: CPT

## 2024-02-21 NOTE — TELEPHONE ENCOUNTER
----- Message from SILVIA Ramon sent at 2/21/2024  1:31 PM CST -----  A1C has improved, continue current meds. Recheck A1C, CMP, Lipid in 6 months

## 2024-02-21 NOTE — TELEPHONE ENCOUNTER
----- Message from SILVIA Ramon sent at 2/21/2024  1:30 PM CST -----  Liver enzymes have normalized

## 2024-03-04 RX ORDER — VALSARTAN AND HYDROCHLOROTHIAZIDE 80; 12.5 MG/1; MG/1
1 TABLET, FILM COATED ORAL DAILY
Qty: 90 TABLET | Refills: 0 | Status: SHIPPED | OUTPATIENT
Start: 2024-03-04

## 2024-03-04 NOTE — TELEPHONE ENCOUNTER
Hypertension Medications Protocol Passed03/04/2024 10:35 AM   Protocol Details CMP or BMP in past 12 months    Last BP reading less than 140/90    In person appointment or virtual visit in the past 12 mos or appointment in next 3 mos    EGFRCR or GFRNAA > 50

## 2024-03-11 RX ORDER — METFORMIN HYDROCHLORIDE 500 MG/1
500 TABLET, EXTENDED RELEASE ORAL
Qty: 90 TABLET | Refills: 0 | Status: SHIPPED | OUTPATIENT
Start: 2024-03-11

## 2024-03-25 RX ORDER — TADALAFIL 20 MG/1
20 TABLET ORAL AS NEEDED
Qty: 18 TABLET | Refills: 0 | Status: SHIPPED | OUTPATIENT
Start: 2024-03-25

## 2024-03-25 NOTE — TELEPHONE ENCOUNTER
Genitourinary Medications Lnaxys2503/25/2024 01:06 AM   Protocol Details Patient does not have pulmonary hypertension on problem list    In person appointment or virtual visit in the past 12 mos or appointment in next 3 mos

## 2024-04-02 ENCOUNTER — TELEPHONE (OUTPATIENT)
Dept: FAMILY MEDICINE CLINIC | Facility: CLINIC | Age: 54
End: 2024-04-02

## 2024-04-02 RX ORDER — METFORMIN HYDROCHLORIDE 500 MG/1
500 TABLET, EXTENDED RELEASE ORAL
Qty: 90 TABLET | Refills: 0 | Status: SHIPPED | OUTPATIENT
Start: 2024-04-02

## 2024-04-02 NOTE — TELEPHONE ENCOUNTER
Pt called said that he only filled a 30 day of his metformin at [facility] because his ins doesn't cover there anymore. Per pt can a ne 90 day be sent to [facility].       metFORMIN  MG Oral Tablet 24 Hr      [facility] - [facility] AT PORTAL TO REGISTERED [facility] SITES, [phone], [phone] [[id number]]

## 2024-04-29 ENCOUNTER — OFFICE VISIT (OUTPATIENT)
Dept: FAMILY MEDICINE CLINIC | Facility: CLINIC | Age: 54
End: 2024-04-29
Payer: COMMERCIAL

## 2024-04-29 VITALS
SYSTOLIC BLOOD PRESSURE: 124 MMHG | WEIGHT: 255 LBS | OXYGEN SATURATION: 96 % | DIASTOLIC BLOOD PRESSURE: 70 MMHG | RESPIRATION RATE: 16 BRPM | BODY MASS INDEX: 34.54 KG/M2 | TEMPERATURE: 98 F | HEIGHT: 72 IN | HEART RATE: 87 BPM

## 2024-04-29 DIAGNOSIS — H93.8X1 EAR FULLNESS, RIGHT: Primary | ICD-10-CM

## 2024-04-29 DIAGNOSIS — R05.1 ACUTE COUGH: ICD-10-CM

## 2024-04-29 DIAGNOSIS — J01.40 ACUTE NON-RECURRENT PANSINUSITIS: ICD-10-CM

## 2024-04-29 RX ORDER — AZITHROMYCIN 250 MG/1
TABLET, FILM COATED ORAL
Qty: 6 TABLET | Refills: 0 | Status: SHIPPED | OUTPATIENT
Start: 2024-04-29 | End: 2024-05-04

## 2024-04-29 RX ORDER — BENZONATATE 100 MG/1
100 CAPSULE ORAL 2 TIMES DAILY PRN
Qty: 10 CAPSULE | Refills: 0 | Status: SHIPPED | OUTPATIENT
Start: 2024-04-29

## 2024-04-29 NOTE — PROGRESS NOTES
CHIEF COMPLAINT:    Chief Complaint   Patient presents with    Cough     Started: 2 weeks ago       HISTORY OF PRESENT ILLNESS:    John who has a history of diabetes, heart disease, and sleep apnea presents today, April 29, 2024, for two week history of productive cough.  Has tried zyrtec, Nasacort, and [employer] brand severe cough and chest congestion medication.  Denies sinus pressure, wheezing, shortness of breath, fevers, chills, or night sweats.    +HTN, controlled  +DM, controlled  +SURENDRA, stable, using CPAP nightly, recently cleaned equipment/supplies    ALLERGIES:  No Known Allergies    CURRENT MEDICATIONS:  Current Outpatient Medications   Medication Sig Dispense Refill    metFORMIN  MG Oral Tablet 24 Hr Take 1 tablet (500 mg total) by mouth daily with breakfast. 90 tablet 0    TADALAFIL 20 MG Oral Tab TAKE 1 TABLET AS NEEDED FORERECTILE DYSFUNCTION 18 tablet 0    valsartan-hydroCHLOROthiazide 80-12.5 MG Oral Tab Take 1 tablet by mouth daily. 90 tablet 0    ATORVASTATIN 20 MG Oral Tab TAKE 1 TABLET NIGHTLY 90 tablet 0    triamcinolone 55 MCG/ACT Nasal Aerosol by Nasal route as needed.         MEDICAL HISTORY:  Past Medical History:    Back pain    Essential hypertension    Hives    Hypercholesterolemia    Obesity    Other and unspecified hyperlipidemia    Spinal stenosis     History reviewed. No pertinent surgical history.  Family History   Problem Relation Age of Onset    Hypertension Father     Other (Hyerplipidemia [Other]) Father     Stroke Father     Other (Hyperlipidemia [Other]) Mother     Thyroid disease Mother     Hypertension Paternal Grandfather     Other (Hyperlipidemia [Other]) Paternal Grandfather     Heart Disease Paternal Grandfather     Hypertension Paternal Grandmother     Stroke Paternal Grandmother     Other (Alcohol abuse [Other]) Maternal Grandfather     Dementia Maternal Grandmother      Family Status   Relation Status    Fa Alive    Mo Alive    PGFA (Not Specified)    PGMA (Not  Specified)    MGFA (Not Specified)    MGMA (Not Specified)     Social History     Socioeconomic History    Marital status:    Tobacco Use    Smoking status: Never    Smokeless tobacco: Never    Tobacco comments:     non-smoker   Vaping Use    Vaping status: Never Used   Substance and Sexual Activity    Alcohol use: Yes     Alcohol/week: 8.0 standard drinks of alcohol     Types: 8 Standard drinks or equivalent per week     Comment: SOCIALLY    Drug use: No     Social Determinants of Health     Financial Resource Strain: Low Risk  (3/8/2022)    [facility]    Overall Financial Resource Strain (CARDIA)     Difficulty of Paying Living Expenses: Not very hard   Transportation Needs: No Transportation Needs (3/8/2022)    [facility]    PRAPARE - Transportation     Lack of Transportation (Medical): No     Lack of Transportation (Non-Medical): No    [facility], [facility]    Social Connections    Received from [facility], [facility]    Housing Stability       ROS:  GENERAL:  Denies recorded temperatures greater than 100.5F  RESPIRATORY:  Denies difficulty breathing  CARDIAC:  Denies chest pain with exertion    VITALS:   /70   Pulse 87   Temp 97.6 °F (36.4 °C) (Temporal)   Resp 16   Ht 6' (1.829 m)   Wt 255 lb (115.7 kg)   SpO2 96%   BMI 34.58 kg/m²     Reviewed by Mayela Murphy MS, APRN, FNP-BC    PHYSICAL EXAM:    Constitutional:       Appears well.  Sitting upright on exam table.  Well developed, well nourished, and in no acute distress  HEENT:      Facial features symmetric. Normocephalic and atraumatic  EAR:      Left ear canal clear.         Right ear canal clear.         Right TM dull, cloudy, and intact, neutral in position.       Left TM clear and intact, neutral in position.   Mouth:        Buccal mucosa is  moist and pink.  No ulcerations or lesions.  Uvula rises midline.        Posterior pharynx is erythematous.        No tonsillar enlargement, exudate, deformity or lesions.  Cardiovascular:      Heart sounds: Regular rate and rhythm without murmur   Pulmonary:      Chest expansion symmetric.  Breathing nonlabored. Lungs clear throughout     No cough.  Musculoskeletal:         Movements smooth and controlled with appropriate coordination.       Gait is steady, nonantalgic.  Neuro:       No focal deficits, cranial nerves grossly intact.       Movements smooth and controlled, appropriate coordination without ataxia or tremors.  Skin:     Warm and dry without jaundice or rashes.  Psychiatric:         Alert and oriented.  Calm and cooperative.  Speech is clear.     ASSESSMENT & PLAN:  Continue supportive care as discussed  Ear fullness, begin abx if worsening  Discussed shingrix, will reach out to pharmacy in May once is feeling better    1. Ear fullness, right  - azithromycin 250 MG Oral Tab; Take 2 tablets (500 mg total) by mouth daily for 1 day, THEN 1 tablet (250 mg total) daily for 4 days.  Dispense: 6 tablet; Refill: 0    2. Acute non-recurrent pansinusitis  - azithromycin 250 MG Oral Tab; Take 2 tablets (500 mg total) by mouth daily for 1 day, THEN 1 tablet (250 mg total) daily for 4 days.  Dispense: 6 tablet; Refill: 0    3. Acute cough  - benzonatate 100 MG Oral Cap; Take 1 capsule (100 mg total) by mouth 2 (two) times daily as needed.  Dispense: 10 capsule; Refill: 0

## 2024-06-24 RX ORDER — TADALAFIL 20 MG/1
20 TABLET ORAL AS NEEDED
Qty: 18 TABLET | Refills: 0 | Status: SHIPPED | OUTPATIENT
Start: 2024-06-24

## 2024-06-24 NOTE — TELEPHONE ENCOUNTER
Last office visit 4/29/24  Last refilled on 3/25/24 for # 18 with 0 refills  No future appointments.     Thank you.

## 2024-07-01 RX ORDER — VALSARTAN AND HYDROCHLOROTHIAZIDE 80; 12.5 MG/1; MG/1
1 TABLET, FILM COATED ORAL DAILY
Qty: 90 TABLET | Refills: 0 | Status: SHIPPED | OUTPATIENT
Start: 2024-07-01

## 2024-07-01 RX ORDER — METFORMIN HYDROCHLORIDE 500 MG/1
500 TABLET, EXTENDED RELEASE ORAL
Qty: 90 TABLET | Refills: 0 | Status: SHIPPED | OUTPATIENT
Start: 2024-07-01

## 2024-07-01 NOTE — TELEPHONE ENCOUNTER
Hypertension Medications Protocol Passed      Last office visit 4/29/24 with Mayela-protocol passed  Valsartan Last refilled on 3/4/24 for # 90 with 0 refills  Metformin 4/2/24 90 0 refill  No future appointments.     Thank you.        Diabetes Medication Protocol [id number]/30/2024 05:05 AM   Protocol Details In person appointment or virtual visit in the past 6 mos or appointment in next 3 mos    Last A1C < 7.5 and within past 6 months    Microalbumin procedure in past 12 months or taking ACE/ARB    EGFRCR or GFRNAA > 50    GFR in the past 12 months

## 2024-09-16 DIAGNOSIS — E83.52 SERUM CALCIUM ELEVATED: ICD-10-CM

## 2024-09-16 DIAGNOSIS — Z23 NEED FOR VACCINATION: Primary | ICD-10-CM

## 2024-09-16 RX ORDER — METFORMIN HCL 500 MG
500 TABLET, EXTENDED RELEASE 24 HR ORAL
Qty: 90 TABLET | Refills: 0 | Status: SHIPPED | OUTPATIENT
Start: 2024-09-16

## 2024-09-16 RX ORDER — VALSARTAN AND HYDROCHLOROTHIAZIDE 80; 12.5 MG/1; MG/1
1 TABLET, FILM COATED ORAL DAILY
Qty: 90 TABLET | Refills: 0 | Status: SHIPPED | OUTPATIENT
Start: 2024-09-16

## 2024-09-16 RX ORDER — TADALAFIL 20 MG/1
20 TABLET ORAL AS NEEDED
Qty: 18 TABLET | Refills: 0 | Status: SHIPPED | OUTPATIENT
Start: 2024-09-16

## 2024-09-16 RX ORDER — ATORVASTATIN CALCIUM 20 MG/1
20 TABLET, FILM COATED ORAL NIGHTLY
Qty: 7 TABLET | Refills: 0 | Status: SHIPPED | OUTPATIENT
Start: 2024-09-16 | End: 2024-09-18

## 2024-09-16 NOTE — TELEPHONE ENCOUNTER
See below  Last dose shingrix 11/15/23  #7 atorvastatin pended    Future Appointments   Date Time Provider Department Center   9/17/2024  8:15 AM REF [facility] [place]

## 2024-09-16 NOTE — TELEPHONE ENCOUNTER
Eligible to get 2nd Shingles vaccine any time. No need to restart series  Would also recommend Prenvar 20 vaccine. Order placed

## 2024-09-16 NOTE — TELEPHONE ENCOUNTER
Hypertension Medications Protocol Passed   Metformin and valsartan hydrochlorothiazide refilled 7/1/24 90 0 refill  Tadalafil 6/24/24 #18 0 refill  Last OV 4/29/24 with Mayela  Future Appointments   Date Time Provider Department Center   9/17/2024  8:15 AM REF [facility] [place]          Diabetes Medication Protocol [id number]/16/2024 01:16 AM   Protocol Details Last A1C < 7.5 and within past 6 months    In person appointment or virtual visit in the past 6 mos or appointment in next 3 mos    Microalbumin procedure in past 12 months or taking ACE/ARB    EGFRCR or GFRNAA > 50    GFR in the past 12 months

## 2024-09-16 NOTE — TELEPHONE ENCOUNTER
Patient called and scheduled blood work for   Future Appointments   Date Time Provider Department Center   9/17/2024  8:15 AM REF [facility] [place]     Patient wanting to know if he can get a 1 month supply of     ATORVASTATIN 20 MG Oral Tab     [id number] - [place], [place] - [address] [place] [facility] [address]     Patient also states he got the first shingles shot a while back but has not gotten the second dose. Patient wanting to know if he is able to get the second dose or if he would have to restart and get the first dose again. Please advise

## 2024-09-17 ENCOUNTER — LABORATORY ENCOUNTER (OUTPATIENT)
Dept: LAB | Age: 54
End: 2024-09-17
Attending: FAMILY MEDICINE
Payer: COMMERCIAL

## 2024-09-17 DIAGNOSIS — R79.89 ELEVATED LFTS: ICD-10-CM

## 2024-09-17 DIAGNOSIS — E11.69 TYPE 2 DIABETES MELLITUS WITH OBESITY (HCC): ICD-10-CM

## 2024-09-17 DIAGNOSIS — E78.00 HYPERCHOLESTEROLEMIA: ICD-10-CM

## 2024-09-17 DIAGNOSIS — E66.9 TYPE 2 DIABETES MELLITUS WITH OBESITY (HCC): ICD-10-CM

## 2024-09-17 LAB
ALBUMIN SERPL-MCNC: 4.5 G/DL (ref 3.2–4.8)
ALBUMIN/GLOB SERPL: 1.6 {RATIO} (ref 1–2)
ALP LIVER SERPL-CCNC: 60 U/L
ALT SERPL-CCNC: 19 U/L
ANION GAP SERPL CALC-SCNC: 6 MMOL/L (ref 0–18)
AST SERPL-CCNC: 17 U/L (ref ?–34)
BILIRUB SERPL-MCNC: 1 MG/DL (ref 0.3–1.2)
BUN BLD-MCNC: 17 MG/DL (ref 9–23)
CALCIUM BLD-MCNC: 10.3 MG/DL (ref 8.7–10.4)
CHLORIDE SERPL-SCNC: 102 MMOL/L (ref 98–112)
CHOLEST SERPL-MCNC: 164 MG/DL (ref ?–200)
CO2 SERPL-SCNC: 29 MMOL/L (ref 21–32)
CREAT BLD-MCNC: 1.05 MG/DL
EGFRCR SERPLBLD CKD-EPI 2021: 84 ML/MIN/1.73M2 (ref 60–?)
EST. AVERAGE GLUCOSE BLD GHB EST-MCNC: 143 MG/DL (ref 68–126)
FASTING PATIENT LIPID ANSWER: YES
FASTING STATUS PATIENT QL REPORTED: YES
GLOBULIN PLAS-MCNC: 2.9 G/DL (ref 2–3.5)
GLUCOSE BLD-MCNC: 133 MG/DL (ref 70–99)
HBA1C MFR BLD: 6.6 % (ref ?–5.7)
HDLC SERPL-MCNC: 52 MG/DL (ref 40–59)
LDLC SERPL CALC-MCNC: 95 MG/DL (ref ?–100)
NONHDLC SERPL-MCNC: 112 MG/DL (ref ?–130)
OSMOLALITY SERPL CALC.SUM OF ELEC: 287 MOSM/KG (ref 275–295)
POTASSIUM SERPL-SCNC: 4.3 MMOL/L (ref 3.5–5.1)
PROT SERPL-MCNC: 7.4 G/DL (ref 5.7–8.2)
SODIUM SERPL-SCNC: 137 MMOL/L (ref 136–145)
TRIGL SERPL-MCNC: 91 MG/DL (ref 30–149)
VLDLC SERPL CALC-MCNC: 15 MG/DL (ref 0–30)

## 2024-09-17 PROCEDURE — 80053 COMPREHEN METABOLIC PANEL: CPT

## 2024-09-17 PROCEDURE — 83036 HEMOGLOBIN GLYCOSYLATED A1C: CPT

## 2024-09-17 PROCEDURE — 80061 LIPID PANEL: CPT

## 2024-09-18 ENCOUNTER — TELEPHONE (OUTPATIENT)
Dept: FAMILY MEDICINE CLINIC | Facility: CLINIC | Age: 54
End: 2024-09-18

## 2024-09-18 DIAGNOSIS — E78.00 HYPERCHOLESTEROLEMIA: Primary | ICD-10-CM

## 2024-09-18 DIAGNOSIS — E11.65 TYPE 2 DIABETES MELLITUS WITH HYPERGLYCEMIA, WITHOUT LONG-TERM CURRENT USE OF INSULIN (HCC): ICD-10-CM

## 2024-09-18 RX ORDER — ATORVASTATIN CALCIUM 40 MG/1
40 TABLET, FILM COATED ORAL NIGHTLY
Qty: 90 TABLET | Refills: 0 | Status: SHIPPED | OUTPATIENT
Start: 2024-09-18 | End: 2024-12-17

## 2024-09-18 RX ORDER — ATORVASTATIN CALCIUM 40 MG/1
40 TABLET, FILM COATED ORAL NIGHTLY
Qty: 30 TABLET | Refills: 0 | Status: SHIPPED | OUTPATIENT
Start: 2024-09-18 | End: 2024-09-18

## 2024-09-18 RX ORDER — ATORVASTATIN CALCIUM 20 MG/1
20 TABLET, FILM COATED ORAL NIGHTLY
Qty: 30 TABLET | Refills: 0 | Status: SHIPPED | OUTPATIENT
Start: 2024-09-18 | End: 2024-09-18 | Stop reason: CLARIF

## 2024-09-18 NOTE — TELEPHONE ENCOUNTER
Component      Latest Ref Rng 10/2/2020 1/19/2022 3/4/2022 6/28/2022 11/15/2023 1/30/2024 9/17/2024   CALCIUM      8.7 - 10.4 mg/dL 9.6  9.7  8.6  9.3  9.9  9.4  10.3      Overall calcium is relatively stable and in normal range  Could consider checking PTH to rule out hyperparathyroidism. Unable to add to previously collected medication  Does he take multivitamin with calcium?

## 2024-09-18 NOTE — TELEPHONE ENCOUNTER
Patient advised. Verbalized understanding. Declined metformin increase at this time.  Would like to get PTH done  Doesn't take any vitamins with calcium  Patient had question about increasing statin  States his cholesterol is all within normal limits  Please advise      --- Message from Kendal Davalos sent at 9/18/2024 10:14 AM CDT -----  Results reviewed.   Mild improvement in A1C. Could consider increasing Metformin to 1000 mg ER if interested  Chemistries stable, mild hyperglygemia  Cholesterol improved but not at goal. Increase dose of statin. New prescription sent to local and mail order pharmacy     Recheck lipid, CMP, A1C in 3 months

## 2024-09-18 NOTE — TELEPHONE ENCOUNTER
----- Message from Kendal Davalos sent at 9/18/2024 10:14 AM CDT -----  Results reviewed.   Mild improvement in A1C. Could consider increasing Metformin to 1000 mg ER if interested  Chemistries stable, mild hyperglygemia  Cholesterol improved but not at goal. Increase dose of statin. New prescription sent to local and mail order pharmacy    Recheck lipid, CMP, A1C in 3 months

## 2024-09-18 NOTE — TELEPHONE ENCOUNTER
Patient called back - explained that he can get the 2nd Shingrix vaccine - no need to repeat the series.  Also, recommended for Prevnar vaccine.  Instructed to call back and schedule a nurse visit.    Patient had labs done yesterday.  If ok to move forward with statin medication -   Would like 30 day script sent to [facility] [address]  Then 90 days to [facility] mail service.    Does not have any medication left.    Also, he did have a question regarding his calcium results - it is within normal limits but trending up.  Any concern about this.    Informed him that results had not been reviewed by provider yet.     06-Jul-2019 23:32

## 2024-09-24 ENCOUNTER — TELEPHONE (OUTPATIENT)
Dept: FAMILY MEDICINE CLINIC | Facility: CLINIC | Age: 54
End: 2024-09-24

## 2024-09-24 NOTE — TELEPHONE ENCOUNTER
Patient called in - states has 3 prescriptions at [facility] - 2 for 20mg and 1 for 40mg.    Prescription for 90 days had also been sent to [facility] but has not received that one.    Contacted [facility] - cancelled all prescriptions except for Atorvastatin 40mg - 30 days to use until mail order supply comes.

## 2024-11-20 ENCOUNTER — OFFICE VISIT (OUTPATIENT)
Dept: FAMILY MEDICINE CLINIC | Facility: CLINIC | Age: 54
End: 2024-11-20
Payer: COMMERCIAL

## 2024-11-20 VITALS
SYSTOLIC BLOOD PRESSURE: 126 MMHG | HEART RATE: 98 BPM | BODY MASS INDEX: 35.35 KG/M2 | DIASTOLIC BLOOD PRESSURE: 76 MMHG | TEMPERATURE: 97 F | RESPIRATION RATE: 16 BRPM | HEIGHT: 72 IN | WEIGHT: 261 LBS | OXYGEN SATURATION: 99 %

## 2024-11-20 DIAGNOSIS — R09.82 POSTNASAL DRIP: ICD-10-CM

## 2024-11-20 DIAGNOSIS — J01.40 ACUTE NON-RECURRENT PANSINUSITIS: Primary | ICD-10-CM

## 2024-11-20 PROCEDURE — 3008F BODY MASS INDEX DOCD: CPT

## 2024-11-20 PROCEDURE — 3044F HG A1C LEVEL LT 7.0%: CPT

## 2024-11-20 PROCEDURE — 99214 OFFICE O/P EST MOD 30 MIN: CPT

## 2024-11-20 PROCEDURE — 3078F DIAST BP <80 MM HG: CPT

## 2024-11-20 PROCEDURE — 3074F SYST BP LT 130 MM HG: CPT

## 2024-11-20 NOTE — PROGRESS NOTES
Chief Complaint   Patient presents with    Ear Pain     Since Friday   Fever X 1 day     Sinus Problem     Sinus congestion X 7 days         HPI  John Wilder is a 54 year old M pt who presents today for ear pain and sinus congestion    Pt reports sinus issues, ear pain, headache, and postnasal drip. He reports that his sinus issues began on 6 days ago and his ear pain started the next day. He has also been experiencing a productive cough since yesterday, with dark yellow nasal discharge.He had a fever of 101.5 last night, which has since subsided after taking ibuprofen this morning. He has no problems breathing and uses a CPAP machine at night. He mentions that the CPAP machine ran dry on Tuesday night and had a foul smell, after which he woke up congested the following morning. He changes the water in the CPAP machine every night.    He denies any sick contacts. He tested negative for COVID at home.     Meds: ibuprofen and cough syrup      ROS  As per HPI    Past Medical History:    Back pain    Essential hypertension    Hives    Hypercholesterolemia    Obesity    Other and unspecified hyperlipidemia    Spinal stenosis       History reviewed. No pertinent surgical history.    Social History     Socioeconomic History    Marital status:    Tobacco Use    Smoking status: Never    Smokeless tobacco: Never    Tobacco comments:     non-smoker   Vaping Use    Vaping status: Never Used   Substance and Sexual Activity    Alcohol use: Yes     Alcohol/week: 8.0 standard drinks of alcohol     Types: 8 Standard drinks or equivalent per week     Comment: SOCIALLY    Drug use: No       Family History   Problem Relation Age of Onset    Hypertension Father     Other (Hyerplipidemia [Other]) Father     Stroke Father     Other (Hyperlipidemia [Other]) Mother     Thyroid disease Mother     Hypertension Paternal Grandfather     Other (Hyperlipidemia [Other]) Paternal Grandfather     Heart Disease Paternal Grandfather      Hypertension Paternal Grandmother     Stroke Paternal Grandmother     Other (Alcohol abuse [Other]) Maternal Grandfather     Dementia Maternal Grandmother         Medications Ordered Prior to Encounter[1]      Objective  Vitals:    11/20/24 1004   BP: 126/76   Pulse: 98   Resp: 16   Temp: 97 °F (36.1 °C)   SpO2: 99%   Weight: 261 lb (118.4 kg)   Height: 6' (1.829 m)   Body mass index is 35.4 kg/m².    Physical Exam  Constitutional:       Appearance: Normal appearance.   HEENT:      Head: Normocephalic and atraumatic.      Eyes: PERRLA no notable nystagmus     Ears: normal on observation. TM clear b/l     Nose: Nose normal.      Mouth: Mucous membranes are moist.      Neck: no lymphadenopathy  Cardiovascular:      Rate and Rhythm: Normal rate and regular rhythm.   Pulmonary:      Effort: Pulmonary effort is normal.      Breath sounds: Normal breath sounds.   Musculoskeletal:         General: Normal range of motion.   Skin:     General: Skin is warm and dry.   Neurological:      General: No focal deficit present.      Mental Status: Alert and oriented to person, place, and time.   Psychiatric:         Mood and Affect: Mood normal.         Thought Content: Thought content normal.       Assessment and Plan  John was seen today for ear pain and sinus problem.    Diagnoses and all orders for this visit:    Acute non-recurrent pansinusitis  -     amoxicillin clavulanate 875-125 MG Oral Tab; Take 1 tablet by mouth 2 (two) times daily for 10 days.    Postnasal drip  Comments:  Recommend steroid nasal spray         Afebrile in office  Supportive care counseling provided: fluids, rest, OTC antipyretics, otc cough syrup  ibuprofen/acetaminophen for symptomatic fevers/fevers >104F  Red flag symptoms reviewed: fevers >104F, unremitting fevers despite medications, extreme lethargy, inability to tolerate PO intake  Parent verbalizes understanding, all questions/concerns addressed, in agreement w/plan  RTC as needed, or if symptoms  worsen/persist     Follow up  Return if symptoms worsen or fail to improve.      Patient Instructions  Patient Instructions   It was nice to meet you!  I will reach out via Aiden Benitez MD        [1]   Current Outpatient Medications on File Prior to Visit   Medication Sig Dispense Refill    IBUPROFEN OR       atorvastatin 40 MG Oral Tab Take 1 tablet (40 mg total) by mouth nightly. 90 tablet 0    VALSARTAN-HYDROCHLOROTHIAZIDE 80-12.5 MG Oral Tab TAKE 1 TABLET DAILY 90 tablet 0    METFORMIN  MG Oral Tablet 24 Hr TAKE 1 TABLET DAILY WITH   BREAKFAST 90 tablet 0    TADALAFIL 20 MG Oral Tab TAKE 1 TABLET AS NEEDED FORERECTILE DYSFUNCTION 18 tablet 0    benzonatate 100 MG Oral Cap Take 1 capsule (100 mg total) by mouth 2 (two) times daily as needed. (Patient not taking: Reported on 11/20/2024) 10 capsule 0    triamcinolone 55 MCG/ACT Nasal Aerosol by Nasal route as needed.       No current facility-administered medications on file prior to visit.

## 2024-12-09 ENCOUNTER — TELEPHONE (OUTPATIENT)
Dept: FAMILY MEDICINE CLINIC | Facility: CLINIC | Age: 54
End: 2024-12-09

## 2024-12-09 RX ORDER — VALSARTAN AND HYDROCHLOROTHIAZIDE 80; 12.5 MG/1; MG/1
1 TABLET, FILM COATED ORAL DAILY
Qty: 90 TABLET | Refills: 0 | Status: SHIPPED | OUTPATIENT
Start: 2024-12-09

## 2024-12-09 NOTE — TELEPHONE ENCOUNTER
Last seen in this office by Dr. Benitez for sinusitis - 11/20/2024    Last annual physical 11/15/2023    Received fax from [facility] Tadalafil and Metformin ER

## 2024-12-18 ENCOUNTER — TELEPHONE (OUTPATIENT)
Dept: FAMILY MEDICINE CLINIC | Facility: CLINIC | Age: 54
End: 2024-12-18

## 2025-01-04 ENCOUNTER — HOSPITAL ENCOUNTER (OUTPATIENT)
Age: 55
Discharge: HOME | End: 2025-01-04
Payer: COMMERCIAL

## 2025-01-04 VITALS
OXYGEN SATURATION: 98 % | DIASTOLIC BLOOD PRESSURE: 82 MMHG | TEMPERATURE: 99 F | SYSTOLIC BLOOD PRESSURE: 146 MMHG | HEART RATE: 95 BPM | RESPIRATION RATE: 18 BRPM

## 2025-01-04 DIAGNOSIS — U07.1 COVID: Primary | ICD-10-CM

## 2025-01-04 NOTE — DISCHARGE INSTRUCTIONS
Based on your physical exam, medical history, and work-up here in the immediate care today, you were stable to be discharged home.  You are diagnosed with having Covid-19.  You are contagious until you are fever free for 24 hours with no fever reducing medications and you have an improvement in symptoms.  Rest and stay hydrated.  Tylenol/ibuprofen for pain or fever.  OTC cough medications like Delsom and Mucinex insta soothe throat lozenges.  Monitor your symptoms and if you are significantly worsening or you feel short of breath, call 911, or seek treatment in your local emergency department for reevaluation.  PCP follow up as needed.

## 2025-01-04 NOTE — ED INITIAL ASSESSMENT (HPI)
Pt sts HA, fatigue, cough, nasal congestion began yesterday. Tested positive for covid last night with home test.

## 2025-01-04 NOTE — ED PROVIDER NOTES
Patient Seen in: [facility]      History     Chief Complaint   Patient presents with    Cough/URI     Stated Complaint: COVID +    Subjective:   This is a 54-year-old male with below stated medical history.  Presents to immediate care for COVID symptoms.  Reports headache, cough, nasal congestion, and fatigue.  Symptom started Thursday.  Tested positive for COVID at home yesterday.  No difficulty breathing or wheezing.  No chest pain or shortness of breath.  No fevers.  He is vaccinated for COVID.  Previous COVID infection in 2022 with no hospitalization.    The history is provided by the patient.             Objective:     Past Medical History:    Back pain    Essential hypertension    Hives    Hypercholesterolemia    Obesity    Other and unspecified hyperlipidemia    Spinal stenosis              History reviewed. No pertinent surgical history.             No pertinent social history.            Review of Systems   Constitutional:  Positive for chills and fatigue. Negative for fever.   HENT:  Positive for congestion and rhinorrhea. Negative for sore throat.    Respiratory:  Positive for cough. Negative for shortness of breath, wheezing and stridor.    Cardiovascular:  Negative for chest pain, palpitations and leg swelling.   Gastrointestinal:  Negative for abdominal pain.   Genitourinary:  Negative for dysuria.   Musculoskeletal:  Negative for back pain, neck pain and neck stiffness.   Skin:  Negative for rash.   Neurological:  Positive for headaches.       Positive for stated complaint: COVID +  Other systems are as noted in HPI.  Constitutional and vital signs reviewed.      All other systems reviewed and negative except as noted above.    Physical Exam     ED Triage Vitals [01/04/25 1333]   /82   Pulse 95   Resp 18   Temp 99.1 °F (37.3 °C)   Temp src Oral   SpO2 98 %   O2 Device None (Room air)       Current Vitals:   Vital Signs  BP: 146/82  Pulse: 95  Resp: 18  Temp: 99.1 °F (37.3 °C)  Temp  src: Oral    Oxygen Therapy  SpO2: 98 %  O2 Device: None (Room air)        Physical Exam  Vitals and nursing note reviewed.   Constitutional:       General: He is not in acute distress.     Appearance: Normal appearance. He is not ill-appearing, toxic-appearing or diaphoretic.   HENT:      Head: Normocephalic and atraumatic.      Right Ear: Tympanic membrane, ear canal and external ear normal. There is no impacted cerumen.      Left Ear: Tympanic membrane, ear canal and external ear normal. There is no impacted cerumen.      Nose: Congestion and rhinorrhea present.      Mouth/Throat:      Mouth: Mucous membranes are moist.      Pharynx: Oropharynx is clear. No oropharyngeal exudate or posterior oropharyngeal erythema.   Eyes:      General:         Right eye: No discharge.         Left eye: No discharge.      Extraocular Movements: Extraocular movements intact.      Conjunctiva/sclera: Conjunctivae normal.   Cardiovascular:      Rate and Rhythm: Normal rate.      Heart sounds: Normal heart sounds. No murmur heard.  Pulmonary:      Effort: Pulmonary effort is normal. No respiratory distress.      Breath sounds: Normal breath sounds. No stridor. No wheezing, rhonchi or rales.   Musculoskeletal:      Cervical back: Neck supple.      Right lower leg: No edema.      Left lower leg: No edema.   Skin:     General: Skin is warm and dry.      Capillary Refill: Capillary refill takes less than 2 seconds.      Findings: No rash.   Neurological:      Mental Status: He is alert and oriented to person, place, and time.   Psychiatric:         Mood and Affect: Mood normal.         Behavior: Behavior normal.             ED Course   Labs Reviewed - No data to display         DC home.        MDM      Patient's vital signs are stable, nontoxic and well-appearing.  Presents to immediate care for Covid symptoms.       Differential diagnosis include but is not limited to COVID, influenza, other viral URI, pneumonia    Positive COVID swab  at home.  Lung sounds clear bilaterally.  Patient reports no chest pain or shortness of breath.  No evidence of respiratory distress or hypoxia.  No suspicion for pneumonia. + Vaccinated.      We discussed risks and benefits of Paxlovid therapy.  Patient is declining treatment    DC home.  Symptomatic care discussed.  Reinforce quarantine, the importance of hand hygiene and infection prevention measures, and encouraged to follow-up with PCP in 1 week for reeval.  Educated on reasons to seek care in the ED including worsening symptoms, chest pain, or shortness of breath.  Patient verbalized understanding.  All questions answered.          Medical Decision Making      Disposition and Plan     Clinical Impression:  1. COVID         Disposition:  Discharge  1/4/2025  1:48 pm    Follow-up:  Josie Boyer MD  [address] [id number] [phone]      As needed          Medications Prescribed:  Discharge Medication List as of 1/4/2025  1:49 PM              Supplementary Documentation:

## 2025-01-17 ENCOUNTER — TELEPHONE (OUTPATIENT)
Dept: FAMILY MEDICINE CLINIC | Facility: CLINIC | Age: 55
End: 2025-01-17

## 2025-01-30 ENCOUNTER — LAB ENCOUNTER (OUTPATIENT)
Dept: LAB | Age: 55
End: 2025-01-30
Attending: NURSE PRACTITIONER
Payer: COMMERCIAL

## 2025-01-30 ENCOUNTER — TELEPHONE (OUTPATIENT)
Dept: FAMILY MEDICINE CLINIC | Facility: CLINIC | Age: 55
End: 2025-01-30

## 2025-01-30 DIAGNOSIS — E83.52 SERUM CALCIUM ELEVATED: ICD-10-CM

## 2025-01-30 DIAGNOSIS — E11.65 TYPE 2 DIABETES MELLITUS WITH HYPERGLYCEMIA, WITHOUT LONG-TERM CURRENT USE OF INSULIN (HCC): ICD-10-CM

## 2025-01-30 DIAGNOSIS — E78.00 HYPERCHOLESTEROLEMIA: ICD-10-CM

## 2025-01-30 LAB
ALBUMIN SERPL-MCNC: 4.8 G/DL (ref 3.2–4.8)
ALBUMIN/GLOB SERPL: 1.8 {RATIO} (ref 1–2)
ALP LIVER SERPL-CCNC: 69 U/L
ALT SERPL-CCNC: 32 U/L
ANION GAP SERPL CALC-SCNC: 8 MMOL/L (ref 0–18)
AST SERPL-CCNC: 23 U/L (ref ?–34)
BILIRUB SERPL-MCNC: 1.1 MG/DL (ref 0.3–1.2)
BUN BLD-MCNC: 22 MG/DL (ref 9–23)
CALCIUM BLD-MCNC: 9.6 MG/DL (ref 8.7–10.6)
CHLORIDE SERPL-SCNC: 103 MMOL/L (ref 98–112)
CHOLEST SERPL-MCNC: 177 MG/DL (ref ?–200)
CO2 SERPL-SCNC: 28 MMOL/L (ref 21–32)
CREAT BLD-MCNC: 1.09 MG/DL
EGFRCR SERPLBLD CKD-EPI 2021: 81 ML/MIN/1.73M2 (ref 60–?)
EST. AVERAGE GLUCOSE BLD GHB EST-MCNC: 157 MG/DL (ref 68–126)
FASTING PATIENT LIPID ANSWER: YES
FASTING STATUS PATIENT QL REPORTED: YES
GLOBULIN PLAS-MCNC: 2.6 G/DL (ref 2–3.5)
GLUCOSE BLD-MCNC: 157 MG/DL (ref 70–99)
HBA1C MFR BLD: 7.1 % (ref ?–5.7)
HDLC SERPL-MCNC: 47 MG/DL (ref 40–59)
LDLC SERPL CALC-MCNC: 107 MG/DL (ref ?–100)
NONHDLC SERPL-MCNC: 130 MG/DL (ref ?–130)
OSMOLALITY SERPL CALC.SUM OF ELEC: 295 MOSM/KG (ref 275–295)
POTASSIUM SERPL-SCNC: 4.2 MMOL/L (ref 3.5–5.1)
PROT SERPL-MCNC: 7.4 G/DL (ref 5.7–8.2)
PTH-INTACT SERPL-MCNC: 68.1 PG/ML (ref 18.5–88)
SODIUM SERPL-SCNC: 139 MMOL/L (ref 136–145)
TRIGL SERPL-MCNC: 132 MG/DL (ref 30–149)
VLDLC SERPL CALC-MCNC: 22 MG/DL (ref 0–30)

## 2025-01-30 PROCEDURE — 83970 ASSAY OF PARATHORMONE: CPT | Performed by: NURSE PRACTITIONER

## 2025-01-30 PROCEDURE — 80061 LIPID PANEL: CPT | Performed by: NURSE PRACTITIONER

## 2025-01-30 PROCEDURE — 83036 HEMOGLOBIN GLYCOSYLATED A1C: CPT | Performed by: NURSE PRACTITIONER

## 2025-01-30 PROCEDURE — 80053 COMPREHEN METABOLIC PANEL: CPT | Performed by: NURSE PRACTITIONER

## 2025-01-30 NOTE — TELEPHONE ENCOUNTER
----- Message from Kendal Davalos sent at 1/30/2025 12:27 PM CST -----  Please have patient schedule physical and DM follow up

## 2025-01-31 NOTE — TELEPHONE ENCOUNTER
Scheduled   Future Appointments   Date Time Provider Department Center   2/7/2025  8:00 AM Kendal Davalos APRN [facility]

## 2025-02-07 ENCOUNTER — OFFICE VISIT (OUTPATIENT)
Dept: FAMILY MEDICINE CLINIC | Facility: CLINIC | Age: 55
End: 2025-02-07
Payer: COMMERCIAL

## 2025-02-07 ENCOUNTER — TELEPHONE (OUTPATIENT)
Dept: FAMILY MEDICINE CLINIC | Facility: CLINIC | Age: 55
End: 2025-02-07

## 2025-02-07 VITALS
SYSTOLIC BLOOD PRESSURE: 124 MMHG | WEIGHT: 267 LBS | OXYGEN SATURATION: 97 % | DIASTOLIC BLOOD PRESSURE: 76 MMHG | BODY MASS INDEX: 36.16 KG/M2 | HEART RATE: 79 BPM | TEMPERATURE: 98 F | HEIGHT: 72 IN

## 2025-02-07 DIAGNOSIS — Z00.00 GENERAL MEDICAL EXAM: Primary | ICD-10-CM

## 2025-02-07 DIAGNOSIS — Z23 NEED FOR VACCINATION: ICD-10-CM

## 2025-02-07 DIAGNOSIS — E66.9 TYPE 2 DIABETES MELLITUS WITH OBESITY (HCC): ICD-10-CM

## 2025-02-07 DIAGNOSIS — Z12.11 COLON CANCER SCREENING: ICD-10-CM

## 2025-02-07 DIAGNOSIS — E66.812 CLASS 2 SEVERE OBESITY DUE TO EXCESS CALORIES WITH SERIOUS COMORBIDITY AND BODY MASS INDEX (BMI) OF 36.0 TO 36.9 IN ADULT (HCC): ICD-10-CM

## 2025-02-07 DIAGNOSIS — E11.65 TYPE 2 DIABETES MELLITUS WITH HYPERGLYCEMIA, WITHOUT LONG-TERM CURRENT USE OF INSULIN (HCC): ICD-10-CM

## 2025-02-07 DIAGNOSIS — I47.10 SVT (SUPRAVENTRICULAR TACHYCARDIA) (HCC): ICD-10-CM

## 2025-02-07 DIAGNOSIS — E66.01 CLASS 2 SEVERE OBESITY DUE TO EXCESS CALORIES WITH SERIOUS COMORBIDITY AND BODY MASS INDEX (BMI) OF 36.0 TO 36.9 IN ADULT (HCC): ICD-10-CM

## 2025-02-07 DIAGNOSIS — E11.69 TYPE 2 DIABETES MELLITUS WITH OBESITY (HCC): ICD-10-CM

## 2025-02-07 DIAGNOSIS — E78.2 MIXED HYPERLIPIDEMIA: ICD-10-CM

## 2025-02-07 DIAGNOSIS — I10 PRIMARY HYPERTENSION: ICD-10-CM

## 2025-02-07 DIAGNOSIS — D17.1 LIPOMA OF CHEST WALL: ICD-10-CM

## 2025-02-07 DIAGNOSIS — G47.33 OSA ON CPAP: ICD-10-CM

## 2025-02-07 LAB
CREAT UR-SCNC: 95.5 MG/DL
MICROALBUMIN UR-MCNC: 1.7 MG/DL
MICROALBUMIN/CREAT 24H UR-RTO: 17.8 UG/MG (ref ?–30)

## 2025-02-07 PROCEDURE — 82570 ASSAY OF URINE CREATININE: CPT | Performed by: NURSE PRACTITIONER

## 2025-02-07 PROCEDURE — 82043 UR ALBUMIN QUANTITATIVE: CPT | Performed by: NURSE PRACTITIONER

## 2025-02-07 RX ORDER — ATORVASTATIN CALCIUM 40 MG/1
40 TABLET, FILM COATED ORAL NIGHTLY
Qty: 90 TABLET | Refills: 0 | Status: SHIPPED | OUTPATIENT
Start: 2025-02-07 | End: 2025-02-07

## 2025-02-07 RX ORDER — VALSARTAN 80 MG/1
80 TABLET ORAL DAILY
Qty: 90 TABLET | Refills: 0 | Status: SHIPPED | OUTPATIENT
Start: 2025-02-07 | End: 2025-05-08

## 2025-02-07 RX ORDER — METFORMIN HYDROCHLORIDE 500 MG/1
1000 TABLET, EXTENDED RELEASE ORAL
Qty: 180 TABLET | Refills: 0 | Status: SHIPPED | OUTPATIENT
Start: 2025-02-07 | End: 2025-05-08

## 2025-02-07 RX ORDER — METFORMIN HYDROCHLORIDE 500 MG/1
1000 TABLET, EXTENDED RELEASE ORAL
Qty: 180 TABLET | Refills: 0 | Status: SHIPPED | OUTPATIENT
Start: 2025-02-07 | End: 2025-02-07

## 2025-02-07 RX ORDER — VALSARTAN 80 MG/1
80 TABLET ORAL DAILY
Qty: 90 TABLET | Refills: 0 | Status: SHIPPED | OUTPATIENT
Start: 2025-02-07 | End: 2025-02-07

## 2025-02-07 RX ORDER — ATORVASTATIN CALCIUM 40 MG/1
40 TABLET, FILM COATED ORAL NIGHTLY
Qty: 90 TABLET | Refills: 0 | Status: SHIPPED | OUTPATIENT
Start: 2025-02-07 | End: 2025-05-08

## 2025-02-07 RX ORDER — TADALAFIL 20 MG/1
20 TABLET ORAL AS NEEDED
Qty: 18 TABLET | Refills: 0 | Status: SHIPPED | OUTPATIENT
Start: 2025-02-07

## 2025-02-07 NOTE — PROGRESS NOTES
HPI:   Patient is here for physical today.    Concerns:   new lump left side of anterior chest within last month; states had covid about a month ago and lump has been since then ; wondering if it could be from rest the fishing maxx in that place   Discuss blood pressure medication. Has not taken in 2 days and wondering if he needs the medication.     Last Colon Cancer Screen: Never. No family history of colon CA  Last PSA: 11/2023, normal    Exercise: none.  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   Immunization History   Administered Date(s) Administered    Covid-19 Vaccine Moderna 100 mcg/0.5 ml 03/29/2021, 04/26/2021    Covid-19 Vaccine Moderna 50 Mcg/0.25 Ml 11/22/2021    Covid-19 Vaccine Pfizer Bivalent 30mcg/0.3mL 02/06/2023    FLULAVAL 6 months & older 0.5 ml Prefilled syringe (91483) 10/02/2020    FLUZONE 6 months and older PFS 0.5 ml ([id number]) 11/22/2021, 02/06/2023, 11/15/2023    Influenza 11/01/2008    Pneumococcal Conjugate PCV20 02/07/2025    TDAP 11/01/2008, 02/09/2017    Zoster Vaccine Recombinant Adjuvanted (Shingrix) 11/15/2023, 02/07/2025     Cholesterol, Total (mg/dL)   Date Value   01/30/2025 177   09/17/2024 164   11/15/2023 189     CHOLESTEROL, TOTAL (mg/dL)   Date Value   03/01/2019 166   08/15/2018 184     HDL Cholesterol (mg/dL)   Date Value   01/30/2025 47   09/17/2024 52   11/15/2023 60 (H)     HDL CHOLESTEROL (mg/dL)   Date Value   03/01/2019 48   08/15/2018 46     LDL Cholesterol (mg/dL)   Date Value   01/30/2025 107 (H)   09/17/2024 95   11/15/2023 111 (H)     LDL-CHOLESTEROL (mg/dL (calc))   Date Value   03/01/2019 100 (H)   08/15/2018 113 (H)     AST (U/L)   Date Value    01/30/2025 23   09/17/2024 17   02/21/2024 17   03/01/2019 13   08/15/2018 25     ALT (U/L)   Date Value   01/30/2025 32   09/17/2024 19   02/21/2024 30   03/01/2019 18   08/15/2018 40        Allergies:  Allergies[1]   Current Meds:  Medications Ordered Prior to Encounter[2]     History:  Past Medical History:    Back pain    Essential hypertension    Hives    Hypercholesterolemia    Obesity    Other and unspecified hyperlipidemia    Spinal stenosis      History reviewed. No pertinent surgical history.   Family History   Problem Relation Age of Onset    Hypertension Father     Other (Hyerplipidemia [Other]) Father     Stroke Father     Other (Hyperlipidemia [Other]) Mother     Thyroid disease Mother     Hypertension Paternal Grandfather     Other (Hyperlipidemia [Other]) Paternal Grandfather     Heart Disease Paternal Grandfather     Hypertension Paternal Grandmother     Stroke Paternal Grandmother     Other (Alcohol abuse [Other]) Maternal Grandfather     Dementia Maternal Grandmother       Family Status   Relation Status    Fa Alive    Mo Alive    PGFA (Not Specified)    PGMA (Not Specified)    MGFA (Not Specified)    MGMA (Not Specified)      Social History     Socioeconomic History    Marital status:    Tobacco Use    Smoking status: Never    Smokeless tobacco: Never    Tobacco comments:     non-smoker   Vaping Use    Vaping status: Never Used   Substance and Sexual Activity    Alcohol use: Yes     Alcohol/week: 8.0 standard drinks of alcohol     Types: 8 Standard drinks or equivalent per week     Comment: SOCIALLY    Drug use: No     Social Drivers of Health     Food Insecurity: No Food Insecurity (2/7/2025)    NCSS - Food Insecurity     Worried About Running Out of Food in the Last Year: No     Ran Out of Food in the Last Year: No   Transportation Needs: No Transportation Needs (2/7/2025)    NCSS - Transportation     Lack of Transportation: No    Received from [facility]  [facility] Stability        REVIEW OF SYSTEMS:   Review of Systems   Constitutional:  Negative for appetite change, chills, fatigue, fever and unexpected weight change.   HENT:  Negative for congestion, ear pain, postnasal drip, rhinorrhea, sore throat and trouble swallowing.    Respiratory:  Negative for cough and shortness of breath.    Cardiovascular:  Negative for chest pain and palpitations.   Gastrointestinal:  Negative for abdominal pain, constipation, diarrhea, nausea and vomiting.   Endocrine: Negative for cold intolerance and heat intolerance.   Genitourinary:  Negative for dysuria and frequency.   Musculoskeletal:  Negative for myalgias.   Skin:  Negative for rash.   Neurological:  Negative for headaches.   Psychiatric/Behavioral:  Negative for dysphoric mood and sleep disturbance. The patient is not nervous/anxious.         Objective   EXAM:   /76 (BP Location: Right arm, Patient Position: Sitting, Cuff Size: large)   Pulse 79   Temp 97.7 °F (36.5 °C) (Temporal)   Ht 6' (1.829 m)   Wt 267 lb (121.1 kg)   SpO2 97%   BMI 36.21 kg/m²   Ideal body weight: 77.6 kg (171 lb 1.2 oz)  Adjusted ideal body weight: 95 kg (209 lb 7.1 oz)   Physical Exam  Vitals reviewed.   Constitutional:       General: He is not in acute distress.     Appearance: Normal appearance. He is well-developed and well-groomed. He is obese. He is not ill-appearing.   HENT:      Right Ear: Tympanic membrane, ear canal and external ear normal.      Left Ear: Tympanic membrane, ear canal and external ear normal.      Nose: Nose normal.      Mouth/Throat:      Mouth: Mucous membranes are moist.      Pharynx: Oropharynx is clear.   Eyes:      Conjunctiva/sclera: Conjunctivae normal.      Pupils: Pupils are equal, round, and reactive to light.   Neck:      Thyroid: No thyromegaly.   Cardiovascular:      Rate and Rhythm: Normal rate and regular rhythm.      Heart sounds: Normal heart sounds.   Pulmonary:       Effort: Pulmonary effort is normal.      Breath sounds: Normal breath sounds.   Chest:       Abdominal:      General: Abdomen is protuberant. Bowel sounds are normal.      Palpations: Abdomen is soft.      Tenderness: There is no abdominal tenderness.      Hernia: A hernia (reducible) is present. Hernia is present in the umbilical area.   Musculoskeletal:         General: Normal range of motion.      Cervical back: Normal range of motion.   Skin:     General: Skin is warm and dry.   Neurological:      General: No focal deficit present.      Mental Status: He is alert and oriented to person, place, and time.   Psychiatric:         Mood and Affect: Mood normal.         Behavior: Behavior normal.     Bilateral barefoot skin diabetic exam is normal, visualized feet and the appearance is normal.  Bilateral monofilament/sensation of both feet is normal.  Pulsation pedal pulse exam of both lower legs/feet is normal as well.        ASSESSMENT AND PLAN   Diagnoses and all orders for this visit:    General medical exam    Need for vaccination  -     Zoster Recombinant Adjuvanted (Shingrix -Shingles) [39422]  -     Prevnar 20 (PCV20) [90579]    Colon cancer screening  -     COLOGUARD COLON CANCER SCREENING (EXTERNAL)    Type 2 diabetes mellitus with hyperglycemia, without long-term current use of insulin (HCC)  Comments:  A1C mildly increased from previous. Will increase dose of Metformin. Recheck labs in 3 months. Consider GLP-1 if increasing further  Orders:  -     Microalb/Creat Ratio, Random Urine [E]; Future  -     Discontinue: metFORMIN  MG Oral Tablet 24 Hr; Take 2 tablets (1,000 mg total) by mouth daily with breakfast.  -     Hemoglobin A1C [E]; Future  -     Comp Metabolic Panel (14) [E]; Future  -     Lipid Panel [E]; Future  -     metFORMIN  MG Oral Tablet 24 Hr; Take 2 tablets (1,000 mg total) by mouth daily with breakfast.    Type 2 diabetes mellitus with obesity (HCC)  -     Discontinue: metFORMIN ER  500 MG Oral Tablet 24 Hr; Take 2 tablets (1,000 mg total) by mouth daily with breakfast.  -     metFORMIN  MG Oral Tablet 24 Hr; Take 2 tablets (1,000 mg total) by mouth daily with breakfast.    SVT (supraventricular tachycardia) (formerly [facility])  Comments:  stable, following with Dr. Fernandez annually. Last visit 8/2024    Mixed hyperlipidemia  Comments:  Lipids still above goal despite increase in Atorvastatin. Wants to work on diet & recheck in 3 months. If LDL still >70 consider switch to Crestor  Orders:  -     Discontinue: atorvastatin 40 MG Oral Tab; Take 1 tablet (40 mg total) by mouth nightly.  -     Comp Metabolic Panel (14) [E]; Future  -     Lipid Panel [E]; Future  -     atorvastatin 40 MG Oral Tab; Take 1 tablet (40 mg total) by mouth nightly.    Primary hypertension  Comments:  BP well controlled, will stop Hctz. He will monitor BP at home on ARB alone and send log  Orders:  -     Discontinue: valsartan 80 MG Oral Tab; Take 1 tablet (80 mg total) by mouth daily.  -     valsartan 80 MG Oral Tab; Take 1 tablet (80 mg total) by mouth daily.    SURENDRA on CPAP  Comments:  following with pulmonary    Class 2 severe obesity due to excess calories with serious comorbidity and body mass index (BMI) of 36.0 to 36.9 in adult (formerly [facility])    Lipoma of chest wall  Comments:  will montior, if growing in size or becoming bothersome will have him see surgery    Other orders  -     Tadalafil 20 MG Oral Tab; Take 1 tablet (20 mg total) by mouth as needed for Erectile Dysfunction.           [1] No Known Allergies  [2]   Current Outpatient Medications on File Prior to Visit   Medication Sig Dispense Refill    IBUPROFEN OR        No current facility-administered medications on file prior to visit.

## 2025-02-07 NOTE — TELEPHONE ENCOUNTER
----- Message from Kendal Davalos sent at 2/7/2025  1:36 PM CST -----  Results reviewed.   Urine micro normal  Repeat in 1 year

## 2025-02-10 ENCOUNTER — TELEPHONE (OUTPATIENT)
Dept: FAMILY MEDICINE CLINIC | Facility: CLINIC | Age: 55
End: 2025-02-10

## 2025-02-10 NOTE — TELEPHONE ENCOUNTER
Patient states all of his Rx on 2/7 went to [facility] for a 30 day Rx, and it went to [facility] 90,  [facility].  He would like [facility] Rx to be cancelled.

## 2025-03-03 ENCOUNTER — TELEPHONE (OUTPATIENT)
Dept: FAMILY MEDICINE CLINIC | Facility: CLINIC | Age: 55
End: 2025-03-03

## 2025-03-03 NOTE — TELEPHONE ENCOUNTER
From my last note:       Primary hypertension  Comments:  BP well controlled, will stop Hctz. He will monitor BP at home on ARB alone and send log  Orders:  -     Discontinue: valsartan 80 MG Oral Tab; Take 1 tablet (80 mg total) by mouth daily.  -     valsartan 80 MG Oral Tab; Take 1 tablet (80 mg total) by mouth daily.        Please check with patient and see what his blood pressure has been at home. May not need this refill

## 2025-03-05 RX ORDER — VALSARTAN AND HYDROCHLOROTHIAZIDE 80; 12.5 MG/1; MG/1
1 TABLET, FILM COATED ORAL DAILY
Qty: 90 TABLET | Refills: 0 | OUTPATIENT
Start: 2025-03-05

## 2025-03-05 NOTE — TELEPHONE ENCOUNTER
Patient switched to Valsartan 80mg and was discontinuing valsartan/hydrochlorothiazide    Left message to call office back to confirm this is what he is taking and get blood pressure readings

## 2025-03-05 NOTE — TELEPHONE ENCOUNTER
Clarification on what is the current drug therapy?  Duplicate Therapy  Valsartan 80mg Vs Valsartan 80-      Call back: [phone] option 2.  Ref number: [id number]

## 2025-03-13 NOTE — TELEPHONE ENCOUNTER
Spoke with patient - patient states he does not think he ever received the valsartan medication from [facility].  Patient will check his prescription when he gets home - he will let us know if we need to resend the prescription  Patient aware valsartan -hydrochlorothiazide was discontinued.  Patient will check BP when starting just the valsartan.

## 2025-04-03 ENCOUNTER — TELEPHONE (OUTPATIENT)
Dept: FAMILY MEDICINE CLINIC | Facility: CLINIC | Age: 55
End: 2025-04-03

## 2025-04-07 DIAGNOSIS — E78.2 MIXED HYPERLIPIDEMIA: ICD-10-CM

## 2025-04-07 RX ORDER — ATORVASTATIN CALCIUM 40 MG/1
40 TABLET, FILM COATED ORAL NIGHTLY
Qty: 90 TABLET | Refills: 0 | Status: SHIPPED | OUTPATIENT
Start: 2025-04-07 | End: 2025-07-06

## 2025-05-29 ENCOUNTER — LAB ENCOUNTER (OUTPATIENT)
Dept: LAB | Age: 55
End: 2025-05-29
Attending: NURSE PRACTITIONER
Payer: COMMERCIAL

## 2025-05-29 ENCOUNTER — TELEPHONE (OUTPATIENT)
Dept: FAMILY MEDICINE CLINIC | Facility: CLINIC | Age: 55
End: 2025-05-29

## 2025-05-29 DIAGNOSIS — E11.65 TYPE 2 DIABETES MELLITUS WITH HYPERGLYCEMIA, WITHOUT LONG-TERM CURRENT USE OF INSULIN (HCC): ICD-10-CM

## 2025-05-29 DIAGNOSIS — E78.2 MIXED HYPERLIPIDEMIA: ICD-10-CM

## 2025-05-29 LAB
ALBUMIN SERPL-MCNC: 4.7 G/DL (ref 3.2–4.8)
ALBUMIN/GLOB SERPL: 2 {RATIO} (ref 1–2)
ALP LIVER SERPL-CCNC: 63 U/L (ref 45–117)
ALT SERPL-CCNC: 26 U/L (ref 10–49)
ANION GAP SERPL CALC-SCNC: 8 MMOL/L (ref 0–18)
AST SERPL-CCNC: 17 U/L (ref ?–34)
BILIRUB SERPL-MCNC: 0.9 MG/DL (ref 0.3–1.2)
BUN BLD-MCNC: 18 MG/DL (ref 9–23)
CALCIUM BLD-MCNC: 9.7 MG/DL (ref 8.7–10.6)
CHLORIDE SERPL-SCNC: 102 MMOL/L (ref 98–112)
CHOLEST SERPL-MCNC: 132 MG/DL (ref ?–200)
CO2 SERPL-SCNC: 29 MMOL/L (ref 21–32)
CREAT BLD-MCNC: 1.06 MG/DL (ref 0.7–1.3)
EGFRCR SERPLBLD CKD-EPI 2021: 83 ML/MIN/1.73M2 (ref 60–?)
EST. AVERAGE GLUCOSE BLD GHB EST-MCNC: 166 MG/DL (ref 68–126)
FASTING PATIENT LIPID ANSWER: YES
FASTING STATUS PATIENT QL REPORTED: YES
GLOBULIN PLAS-MCNC: 2.3 G/DL (ref 2–3.5)
GLUCOSE BLD-MCNC: 146 MG/DL (ref 70–99)
HBA1C MFR BLD: 7.4 % (ref ?–5.7)
HDLC SERPL-MCNC: 49 MG/DL (ref 40–59)
LDLC SERPL CALC-MCNC: 63 MG/DL (ref ?–100)
NONHDLC SERPL-MCNC: 83 MG/DL (ref ?–130)
OSMOLALITY SERPL CALC.SUM OF ELEC: 293 MOSM/KG (ref 275–295)
POTASSIUM SERPL-SCNC: 4.1 MMOL/L (ref 3.5–5.1)
PROT SERPL-MCNC: 7 G/DL (ref 5.7–8.2)
SODIUM SERPL-SCNC: 139 MMOL/L (ref 136–145)
TRIGL SERPL-MCNC: 109 MG/DL (ref 30–149)
VLDLC SERPL CALC-MCNC: 16 MG/DL (ref 0–30)

## 2025-05-29 PROCEDURE — 80053 COMPREHEN METABOLIC PANEL: CPT | Performed by: NURSE PRACTITIONER

## 2025-05-29 PROCEDURE — 83036 HEMOGLOBIN GLYCOSYLATED A1C: CPT | Performed by: NURSE PRACTITIONER

## 2025-05-29 PROCEDURE — 80061 LIPID PANEL: CPT | Performed by: NURSE PRACTITIONER

## 2025-05-29 NOTE — TELEPHONE ENCOUNTER
----- Message from Kendal Davalos sent at 5/29/2025 12:18 PM CDT -----  Results reviewed.   Chemistries normal except for hyperglycemia  A1C increased from previous. On highest dose of Metformin, Would he be interested in trying Ozempic injection?   Cholesterol at goal, continue current dose of Atorvastatin     ----- Message -----  From: Lab, Background User  Sent: 5/29/2025  11:24 AM CDT  To: SILVIA Tariq

## 2025-05-31 NOTE — TELEPHONE ENCOUNTER
Delivery Read From Message Type Attachments Subject   5/29/2025 12:58 PM Y Nikkie Ayala RN Patient Medical Advice Request  test result

## 2025-06-25 ENCOUNTER — HOSPITAL ENCOUNTER (OUTPATIENT)
Age: 55
Discharge: HOME | End: 2025-06-25
Payer: COMMERCIAL

## 2025-06-25 VITALS
TEMPERATURE: 98 F | DIASTOLIC BLOOD PRESSURE: 66 MMHG | WEIGHT: 258 LBS | BODY MASS INDEX: 34.95 KG/M2 | HEART RATE: 72 BPM | HEIGHT: 72 IN | SYSTOLIC BLOOD PRESSURE: 156 MMHG | RESPIRATION RATE: 18 BRPM | OXYGEN SATURATION: 99 %

## 2025-06-25 DIAGNOSIS — L02.91 ABSCESS: Primary | ICD-10-CM

## 2025-06-25 PROCEDURE — 99213 OFFICE O/P EST LOW 20 MIN: CPT | Performed by: NURSE PRACTITIONER

## 2025-06-25 RX ORDER — CEPHALEXIN 500 MG/1
500 CAPSULE ORAL 4 TIMES DAILY
Qty: 40 CAPSULE | Refills: 0 | Status: SHIPPED | OUTPATIENT
Start: 2025-06-25 | End: 2025-07-05

## 2025-06-25 NOTE — DISCHARGE INSTRUCTIONS
Warm soaks with antibiotics as directed.  If any increased swelling, redness, or fever go to ER.  Follow up with surgery.

## 2025-06-25 NOTE — ED PROVIDER NOTES
Patient Seen in: [facility]        History  Chief Complaint   Patient presents with    Rash Skin Problem     Stated Complaint: Boil on chest    Subjective:   HPI  55-year-old male with back pain, hypertension, and hyperlipidemia presents with a red raised area underneath his left nipple that has been there for a week.  He states previously there was something that looked like an ingrown hair to that area that was not red for some time.      Objective:     Past Medical History:    Back pain    Essential hypertension    Hives    Hypercholesterolemia    Obesity    Other and unspecified hyperlipidemia    Spinal stenosis              History reviewed. No pertinent surgical history.             No pertinent social history.            Review of Systems   All other systems reviewed and are negative.      Positive for stated complaint: Boil on chest  Other systems are as noted in HPI.  Constitutional and vital signs reviewed.      All other systems reviewed and negative except as noted above.                  Physical Exam    ED Triage Vitals [06/25/25 1610]   /66   Pulse 72   Resp 18   Temp 98.2 °F (36.8 °C)   Temp src Oral   SpO2 99 %   O2 Device None (Room air)       Current Vitals:   Vital Signs  BP: 156/66  Pulse: 72  Resp: 18  Temp: 98.2 °F (36.8 °C)  Temp src: Oral    Oxygen Therapy  SpO2: 99 %  O2 Device: None (Room air)            Physical Exam  Vitals and nursing note reviewed.   Constitutional:       General: He is not in acute distress.     Appearance: He is well-developed. He is not ill-appearing or toxic-appearing.   Cardiovascular:      Rate and Rhythm: Normal rate.   Pulmonary:      Effort: Pulmonary effort is normal.   Chest:          Comments: Approximate 2 x 4 cm area of erythema that is indurated without any fluctuance  Skin:     General: Skin is warm and dry.   Neurological:      Mental Status: He is alert and oriented to person, place, and time.                 ED Course  Labs  Reviewed - No data to display                         Medical Decision Making  55-year-old presents with skin change in the left nipple.    Differential diagnosis: Abscess, cellulitis, infected sebaceous cyst, cancer    Due to previous bumping there for some time and the redness just starting and no obvious fluctuance I decided to forego I&D at this time and to discharge on antibiotics with warm compresses.    Patient diagnosed with abscess with follow-up with surgery and Keflex with warm soaks.  Patient afebrile and nontoxic.    Disposition and Plan     Clinical Impression:  1. Abscess         Disposition:  Discharge  6/25/2025  4:32 pm    Follow-up:  Warren Dotson MD  [address] [facility] [id number] [phone]    Call             Medications Prescribed:  Current Discharge Medication List        START taking these medications    Details   cephALEXin 500 MG Oral Cap Take 1 capsule (500 mg total) by mouth 4 (four) times daily for 10 days.  Qty: 40 capsule, Refills: 0                   Supplementary Documentation:

## 2025-06-25 NOTE — ED INITIAL ASSESSMENT (HPI)
Pt sts raised, red, tender, warm to touch area below left nipple for the past 6 days.  Denies drng or fever.

## 2025-07-07 ENCOUNTER — OFFICE VISIT (OUTPATIENT)
Facility: LOCATION | Age: 55
End: 2025-07-07
Payer: COMMERCIAL

## 2025-07-07 VITALS
SYSTOLIC BLOOD PRESSURE: 134 MMHG | TEMPERATURE: 98 F | HEART RATE: 74 BPM | RESPIRATION RATE: 18 BRPM | OXYGEN SATURATION: 98 % | DIASTOLIC BLOOD PRESSURE: 79 MMHG

## 2025-07-07 DIAGNOSIS — L72.0 EPIDERMOID CYST OF SKIN OF CHEST: Primary | ICD-10-CM

## 2025-07-07 PROCEDURE — 99203 OFFICE O/P NEW LOW 30 MIN: CPT | Performed by: STUDENT IN AN ORGANIZED HEALTH CARE EDUCATION/TRAINING PROGRAM

## 2025-07-07 PROCEDURE — 3078F DIAST BP <80 MM HG: CPT | Performed by: STUDENT IN AN ORGANIZED HEALTH CARE EDUCATION/TRAINING PROGRAM

## 2025-07-07 PROCEDURE — 3075F SYST BP GE 130 - 139MM HG: CPT | Performed by: STUDENT IN AN ORGANIZED HEALTH CARE EDUCATION/TRAINING PROGRAM

## 2025-07-07 RX ORDER — ATORVASTATIN CALCIUM 10 MG/1
40 TABLET, FILM COATED ORAL
COMMUNITY

## 2025-07-07 NOTE — H&P
New Patient Visit Note       Active Problems      1. Epidermoid cyst of skin of chest        Chief Complaint   Chief Complaint   Patient presents with    New Patient     NP- 6/25/25 Urgent Care follow up for boil/abscess on chest on Abx- pt reports the abscess gotten better, done with a course of abx        History of Present Illness   55 year old male who presents for follow up from an urgent care visit on 6/25/2025.     The patient was seen at an [facility] on 6/25/2025 for evaluation of a raised, erythematous area on his chest. During the time of his evaluation, there was no obvious fluctuance palpated, so he was ultimately discharged home with antibiotics and recommended to continue warm compresses.     The patient presents today for follow up from immediate care. He states his symptoms began approximately one week prior to presenting to the immediate care. He states he felt a raised lump that was tender to the touch with overlying erythema.     He denies drainage. He denies fevers or chills. It has decreased in size and the overlying erythema has improved since starting antibiotics.     Allergies  John has no known allergies.    Past Medical / Surgical / Social / Family History    The past medical and past surgical history have been reviewed by me today.    Past Medical History[1]  Past Surgical History[2]    The family history and social history have been reviewed by me today.    Family History[3]  Social Hx on file[4]   Medications - Current[5]      Review of Systems  The Review of Systems has been reviewed by me during today.  Review of Systems   Constitutional:  Negative for chills, diaphoresis, fatigue and fever.   HENT:  Negative for ear discharge, ear pain and sore throat.    Eyes:  Negative for pain and discharge.   Respiratory:  Negative for cough, chest tightness and shortness of breath.    Cardiovascular:  Negative for chest pain, palpitations and leg swelling.   Gastrointestinal:   Negative for abdominal distention, abdominal pain, blood in stool, constipation, diarrhea, nausea and vomiting.   Genitourinary:  Negative for dysuria, frequency, hematuria and urgency.   Skin:  Negative for color change, pallor and rash.   Neurological:  Negative for weakness, light-headedness, numbness and headaches.   Hematological:  Negative for adenopathy. Does not bruise/bleed easily.   Psychiatric/Behavioral:  Negative for agitation and confusion.        Physical Findings   /79   Pulse 74   Temp 97.7 °F (36.5 °C) (Temporal)   Resp 18   SpO2 98%   Physical Exam  Vitals and nursing note reviewed.   Constitutional:       General: He is not in acute distress.     Appearance: Normal appearance.   HENT:      Head: Normocephalic and atraumatic.      Right Ear: External ear normal.      Left Ear: External ear normal.      Nose: Nose normal.   Eyes:      General: No scleral icterus.     Conjunctiva/sclera: Conjunctivae normal.   Chest:      Comments: Mildly erythematous lesion at the 5 o'clock location of left chest wall approximately 1 cm away from the nipple. The area is slightly indurated. No active drainage. No areas of fluctuance.   Musculoskeletal:      Cervical back: Normal range of motion and neck supple.   Neurological:      Mental Status: He is alert.   Psychiatric:         Mood and Affect: Mood normal.         Behavior: Behavior normal.         Thought Content: Thought content normal.             Assessment/Plan  1. Epidermoid cyst of skin of ronaldo Wilder is a 55 year old male referred by Josie Boyer MD for evaluation of left breast abscess. This was treated with antibiotics and has now resolved. There is very mild erythema and small amount of induration and what appears to be an inclusion cyst in the subcutaneous tissue. He has no high risk factors for breat malignancy. Will proceed with excision of the cyst under local anesthetic and pathologic evaluation of the lesion. Risks  and benefits were discussed and all questions were answered.      No orders of the defined types were placed in this encounter.      Imaging & Referrals   None    Follow Up  No follow-ups on file.    Gamaliel Lee MD       [1]   Past Medical History:   Back pain    Essential hypertension    Hives    Hypercholesterolemia    Obesity    Other and unspecified hyperlipidemia    Sleep apnea    Spinal stenosis   [2] History reviewed. No pertinent surgical history.  [3]   Family History  Problem Relation Age of Onset    Hypertension Father     Other (Hyerplipidemia [Other]) Father     Stroke Father     Other (Hyperlipidemia [Other]) Mother     Thyroid disease Mother     Hypertension Paternal Grandfather     Other (Hyperlipidemia [Other]) Paternal Grandfather     Heart Disease Paternal Grandfather     Hypertension Paternal Grandmother     Stroke Paternal Grandmother     Other (Alcohol abuse [Other]) Maternal Grandfather     Dementia Maternal Grandmother    [4]   Social History  Socioeconomic History    Marital status:    Tobacco Use    Smoking status: Never    Smokeless tobacco: Never    Tobacco comments:     non-smoker   Vaping Use    Vaping status: Never Used   Substance and Sexual Activity    Alcohol use: Yes     Alcohol/week: 8.0 standard drinks of alcohol     Types: 8 Standard drinks or equivalent per week     Comment: SOCIALLY    Drug use: No   [5]   Current Outpatient Medications:     atorvastatin 10 MG Oral Tab, 4 tablets (40 mg total)., Disp: , Rfl:     metFORMIN  MG Oral Tablet 24 Hr, Take 2 tablets (1,000 mg total) by mouth daily with breakfast., Disp: 180 tablet, Rfl: 0    valsartan-hydroCHLOROthiazide 80-12.5 MG Oral Tab, Take 1 tablet by mouth daily., Disp: 90 tablet, Rfl: 1    Tadalafil 20 MG Oral Tab, Take 1 tablet (20 mg total) by mouth as needed for Erectile Dysfunction., Disp: 18 tablet, Rfl: 0    IBUPROFEN OR, , Disp: , Rfl:

## 2025-07-17 ENCOUNTER — TELEPHONE (OUTPATIENT)
Facility: LOCATION | Age: 55
End: 2025-07-17

## 2025-07-17 NOTE — TELEPHONE ENCOUNTER
Patient calling because there is a bump behind his ear. He has an appointment for an in-office procedure, and is wondering if Dr. Lee can look at it while he's there, or if he has to go through the whole process of starting again at his primary care or urgent care.     Please advise  Patient is okay with the response being a MyChart message.

## 2025-07-23 ENCOUNTER — OFFICE VISIT (OUTPATIENT)
Facility: LOCATION | Age: 55
End: 2025-07-23

## 2025-07-23 VITALS
SYSTOLIC BLOOD PRESSURE: 125 MMHG | TEMPERATURE: 97 F | DIASTOLIC BLOOD PRESSURE: 70 MMHG | OXYGEN SATURATION: 97 % | HEART RATE: 90 BPM

## 2025-07-23 DIAGNOSIS — R22.2 MASS OF LEFT CHEST WALL: Primary | ICD-10-CM

## 2025-07-23 PROCEDURE — 3078F DIAST BP <80 MM HG: CPT | Performed by: STUDENT IN AN ORGANIZED HEALTH CARE EDUCATION/TRAINING PROGRAM

## 2025-07-23 PROCEDURE — 11403 EXC TR-EXT B9+MARG 2.1-3CM: CPT | Performed by: STUDENT IN AN ORGANIZED HEALTH CARE EDUCATION/TRAINING PROGRAM

## 2025-07-23 PROCEDURE — 88304 TISSUE EXAM BY PATHOLOGIST: CPT | Performed by: STUDENT IN AN ORGANIZED HEALTH CARE EDUCATION/TRAINING PROGRAM

## 2025-07-23 PROCEDURE — 3074F SYST BP LT 130 MM HG: CPT | Performed by: STUDENT IN AN ORGANIZED HEALTH CARE EDUCATION/TRAINING PROGRAM

## 2025-08-07 DIAGNOSIS — E78.2 MIXED HYPERLIPIDEMIA: ICD-10-CM

## 2025-08-07 RX ORDER — ATORVASTATIN CALCIUM 40 MG/1
40 TABLET, FILM COATED ORAL NIGHTLY
Qty: 90 TABLET | Refills: 0 | Status: SHIPPED | OUTPATIENT
Start: 2025-08-07

## 2025-08-07 RX ORDER — TADALAFIL 20 MG/1
20 TABLET ORAL AS NEEDED
Qty: 18 TABLET | Refills: 0 | Status: SHIPPED | OUTPATIENT
Start: 2025-08-07

## 2025-08-12 ENCOUNTER — TELEPHONE (OUTPATIENT)
Facility: LOCATION | Age: 55
End: 2025-08-12

## 2025-08-12 PROCEDURE — 88304 TISSUE EXAM BY PATHOLOGIST: CPT | Performed by: STUDENT IN AN ORGANIZED HEALTH CARE EDUCATION/TRAINING PROGRAM

## 2025-08-12 PROCEDURE — 88305 TISSUE EXAM BY PATHOLOGIST: CPT | Performed by: STUDENT IN AN ORGANIZED HEALTH CARE EDUCATION/TRAINING PROGRAM

## 2025-08-14 ENCOUNTER — OFFICE VISIT (OUTPATIENT)
Dept: FAMILY MEDICINE CLINIC | Facility: CLINIC | Age: 55
End: 2025-08-14

## 2025-08-14 VITALS
OXYGEN SATURATION: 98 % | HEART RATE: 89 BPM | WEIGHT: 257 LBS | SYSTOLIC BLOOD PRESSURE: 118 MMHG | HEIGHT: 72 IN | DIASTOLIC BLOOD PRESSURE: 76 MMHG | RESPIRATION RATE: 16 BRPM | BODY MASS INDEX: 34.81 KG/M2

## 2025-08-14 DIAGNOSIS — M51.369 DEGENERATION OF INTERVERTEBRAL DISC OF LUMBAR REGION, UNSPECIFIED WHETHER PAIN PRESENT: ICD-10-CM

## 2025-08-14 DIAGNOSIS — E11.65 TYPE 2 DIABETES MELLITUS WITH HYPERGLYCEMIA, WITHOUT LONG-TERM CURRENT USE OF INSULIN (HCC): Primary | ICD-10-CM

## 2025-08-14 PROCEDURE — 3008F BODY MASS INDEX DOCD: CPT | Performed by: FAMILY MEDICINE

## 2025-08-14 PROCEDURE — 99214 OFFICE O/P EST MOD 30 MIN: CPT | Performed by: FAMILY MEDICINE

## 2025-08-14 PROCEDURE — 3051F HG A1C>EQUAL 7.0%<8.0%: CPT | Performed by: FAMILY MEDICINE

## 2025-08-14 PROCEDURE — 3061F NEG MICROALBUMINURIA REV: CPT | Performed by: FAMILY MEDICINE

## 2025-08-14 PROCEDURE — 3074F SYST BP LT 130 MM HG: CPT | Performed by: FAMILY MEDICINE

## 2025-08-14 PROCEDURE — 3078F DIAST BP <80 MM HG: CPT | Performed by: FAMILY MEDICINE

## 2025-08-14 RX ORDER — METAXALONE 800 MG/1
800 TABLET ORAL 3 TIMES DAILY
Qty: 30 TABLET | Refills: 0 | Status: SHIPPED | OUTPATIENT
Start: 2025-08-14 | End: 2025-09-03

## 2025-08-14 RX ORDER — ACYCLOVIR 400 MG/1
1 TABLET ORAL
Qty: 9 EACH | Refills: 1 | Status: SHIPPED | OUTPATIENT
Start: 2025-08-14

## 2025-08-14 RX ORDER — METFORMIN HYDROCHLORIDE 500 MG/1
1000 TABLET, EXTENDED RELEASE ORAL 2 TIMES DAILY WITH MEALS
Qty: 360 TABLET | Refills: 0 | Status: SHIPPED | OUTPATIENT
Start: 2025-08-14 | End: 2025-11-12

## 2025-08-14 RX ORDER — ACYCLOVIR 400 MG/1
1 TABLET ORAL
Qty: 3 EACH | Refills: 11 | Status: SHIPPED | OUTPATIENT
Start: 2025-08-14

## 2025-08-27 ENCOUNTER — OFFICE VISIT (OUTPATIENT)
Facility: LOCATION | Age: 55
End: 2025-08-27

## 2025-08-27 VITALS
SYSTOLIC BLOOD PRESSURE: 116 MMHG | DIASTOLIC BLOOD PRESSURE: 71 MMHG | HEART RATE: 67 BPM | TEMPERATURE: 98 F | OXYGEN SATURATION: 96 %

## 2025-08-27 DIAGNOSIS — D23.4 CYST, DERMOID, SCALP AND NECK: ICD-10-CM

## 2025-08-27 DIAGNOSIS — Z98.890 POSTOPERATIVE STATE: Primary | ICD-10-CM

## 2025-08-27 DIAGNOSIS — R22.2 MASS OF LEFT CHEST WALL: ICD-10-CM

## 2025-08-27 PROCEDURE — 99024 POSTOP FOLLOW-UP VISIT: CPT

## 2025-08-27 PROCEDURE — 3078F DIAST BP <80 MM HG: CPT

## 2025-08-27 PROCEDURE — 3074F SYST BP LT 130 MM HG: CPT

## (undated) DIAGNOSIS — I47.1 SVT (SUPRAVENTRICULAR TACHYCARDIA) (HCC): ICD-10-CM

## (undated) DIAGNOSIS — Z02.9 ENCOUNTERS FOR UNSPECIFIED ADMINISTRATIVE PURPOSE: ICD-10-CM

## (undated) NOTE — MR AVS SNAPSHOT
[address] at [address], [address]  [address] [place], [place] [address]  [phone]               Thank you for choosing us for your health care visit with Ryan Maldonado MD.  We are glad to serve you and happy to provide you w Take 1 g by mouth daily. Commonly known as:  LOVAZA           Sildenafil Citrate 100 MG Tabs   Take 1 tablet (100 mg total) by mouth daily as needed for Erectile Dysfunction.    Commonly known as:  VIAGRA           SKELAXIN OR   Take 800 mg by mouth 3 (th Enjoy your food, but eat less. Fully enjoy your food when eating. Don’t eat while distracted and slow down. Avoid over sized portions. Don’t eat while when you’re bored.      EAT THESE FOODS MORE OFTEN: EAT THESE FOODS LESS OFTEN:   Make half your pl

## (undated) NOTE — LETTER
01/17/25    John Wilder   [address] [facility] [id number]           Dear John Wilder     Our records indicate that you have outstanding lab work and/or testing that was ordered for you and has not yet been completed:  Lab Frequency Next Occurrence   Comp Metabolic Panel (14) Once 12/18/2024   Hemoglobin A1C Once 12/18/2024   Lipid Panel Once 12/18/2024   PTH, Intact [E] Once 09/18/2024      To provide you with the best possible care, please complete these orders at your earliest convenience. If you have recently completed these orders please disregard this letter.   To schedule imaging, or outpatient tests please call Central Scheduling at [phone].  For any blood work needed, you can get this done at the Reference Lab in our [place] office anytime Monday-Friday from 7:30am-4:00pm and you do not need an appointment. They are closed for lunch between 12-1pm. They are closed Saturday and Sunday. If you need a time outside of these hours please call us to schedule an appointment.   *If you prefer to use [facility] for your labs please let us know so we can fax your orders.     Thank you,    [facility] [place]

## (undated) NOTE — Clinical Note
Pt declined HFu appt when contacted for TCM. Pt reports he is doing well, denies symptoms. Pt has an appt with Cardiology today. Med rec was reviewed. Thank you.

## (undated) NOTE — LETTER
[address] [place] [address], [address] [place] [address] [address] [facility] [address] [facility] [id number]  [address] [facility] [address] [facility] [id number]    8/26/2022    Dear Nidhi Lazo,    To help us provide the highest quality medical care, [facility] uses a sophisticated computer system to track our patient records. During a review of your record, it appears that you are due for an annual comprehensive Diabetic Eye Exam.  If you already had this done, please let our office know and we will reach out to the physician for the results. Included is a form for you to give to the Optometrist/Ophthalmologist. This will allow them to report the exam to your PCP. If you have questions please do not hesitate to call the office.            Thank you,   [address]

## (undated) NOTE — LETTER
03/28/18        Christoph Javier [address]  [address] [facility] [address]      Dear Jorge Arroyo records indicate that you have outstanding fasting lab work  that was ordered for you and has not yet been completed:          CBC With Differential With Platel

## (undated) NOTE — MR AVS SNAPSHOT
[address] [place] [id number] [phone]               Thank you for choosing us for your health care visit with Nayan Johnson DO.   We are glad to serve you and happy to provide you with this summary of your vi Valsartan-Hydrochlorothiazide 80-12.5 MG Tabs   1 po daily                Where to Get Your Medications      These medications were sent to [address], [place], [phone]  [address] [place] [address], [address]